# Patient Record
Sex: FEMALE | Race: WHITE | NOT HISPANIC OR LATINO | Employment: FULL TIME | URBAN - METROPOLITAN AREA
[De-identification: names, ages, dates, MRNs, and addresses within clinical notes are randomized per-mention and may not be internally consistent; named-entity substitution may affect disease eponyms.]

---

## 2017-06-13 ENCOUNTER — OFFICE VISIT (OUTPATIENT)
Dept: FAMILY MEDICINE | Facility: CLINIC | Age: 67
End: 2017-06-13
Payer: COMMERCIAL

## 2017-06-13 VITALS
HEIGHT: 64 IN | SYSTOLIC BLOOD PRESSURE: 137 MMHG | HEART RATE: 74 BPM | DIASTOLIC BLOOD PRESSURE: 89 MMHG | BODY MASS INDEX: 33.53 KG/M2 | WEIGHT: 196.4 LBS

## 2017-06-13 DIAGNOSIS — N64.4 BREAST PAIN: Primary | ICD-10-CM

## 2017-06-13 PROCEDURE — 99213 OFFICE O/P EST LOW 20 MIN: CPT | Performed by: FAMILY MEDICINE

## 2017-06-13 NOTE — PATIENT INSTRUCTIONS
Thank you for choosing [facility].  You may be receiving a survey in the mail from [facility] regarding your visit today.  Please take a few minutes to complete and return the survey to let us know how we are doing.  Our Clinic hours are:  Mondays    7:20 am - 7 pm  Tues -  Fri  7:20 am - 5 pm  Clinic Phone: [phone]  The clinic lab opens at 7:30 am Mon - Fri and appointments are required.  [facility]. [phone]  Monday-Thursday 8 am - 7pm  Tues/Wed/Fri 8 am - 5:30 pm

## 2017-06-13 NOTE — PROGRESS NOTES
"  SUBJECTIVE:                                                    Kay Mendes is a 66 year old female who presents to clinic today for the following health issues:    Chief Complaint   Patient presents with     Breast Problem     Patient states she has breast tenderness in right breast has a sharp pain that is localized for the last 2 days, she also states she may feel a lump. Patient states she has armpit fullness that it has been going on for X2 months.        She has a maternal history of breast cancer at age 74.  Her last mammogram was normal two years ago.  She does not recall any recent breast trauma, but can localize a small fingertip-size focus on her mid-upper breast where she notes tenderness.    OBJECTIVE: /89 (BP Location: Left arm, Patient Position: Chair, Cuff Size: Adult Large)  Pulse 74  Ht 5' 3.75\" (1.619 m)  Wt 196 lb 6.4 oz (89.1 kg)  BMI 33.98 kg/m2    Breasts are grossly symmetric. No masses are detected, no nipple discharge, no axillary adenopathy.  ON the superior mid-portion of the breast there is an easily visible subcutaneous horizontal vein that appears a little darker in the mid-portion, precisely at the site where the patient notes her pain, suggesting her tenderness may be secondary to a local vascular irritation or trauma rather than originating from the breast tissue itself.    ASSESSMENT: right upper breast pain    PLAN: Bilateral diagnostic mammogram and right breast ultrasound.    Zehra Chinchilla md  "

## 2017-06-13 NOTE — NURSING NOTE
"Chief Complaint   Patient presents with     Breast Problem     Patient states she has breast tenderness in right breast has a sharp pain that is localized for the last 2 days, she also states she may feel a lump. Patient states she has armpit fullness that it has been going on for X2 months.        Initial /89 (BP Location: Left arm, Patient Position: Chair, Cuff Size: Adult Large)  Pulse 74  Ht 5' 3.75\" (1.619 m)  Wt 196 lb 6.4 oz (89.1 kg)  BMI 33.98 kg/m2 Estimated body mass index is 33.98 kg/(m^2) as calculated from the following:    Height as of this encounter: 5' 3.75\" (1.619 m).    Weight as of this encounter: 196 lb 6.4 oz (89.1 kg).  Medication Reconciliation: complete    "

## 2017-06-13 NOTE — MR AVS SNAPSHOT
After Visit Summary   6/13/2017    Kay Mendes    MRN: [id number]           Patient Information     Date Of Birth          1950        Visit Information        Provider Department      6/13/2017 2:00 PM Zehra Chinchilla MD [facility]        Today's Diagnoses     Breast pain    -  1      Care Instructions    Thank you for choosing [facility].  You may be receiving a survey in the mail from [facility] regarding your visit today.  Please take a few minutes to complete and return the survey to let us know how we are doing.  Our Clinic hours are:  Mondays    7:20 am - 7 pm  Tues -  Fri  7:20 am - 5 pm  Clinic Phone: [phone]  The clinic lab opens at 7:30 am Mon - Fri and appointments are required.  [facility]  Ph. [phone]  Monday-Thursday 8 am - 7pm  Tues/Wed/Fri 8 am - 5:30 pm            Follow-ups after your visit        Future tests that were ordered for you today     Open Future Orders        Priority Expected Expires Ordered    MA Diagnostic Digital Bilateral Routine  6/13/2018 6/13/2017    US Breast Right Limited 1-3 Quadrants Routine  6/13/2018 6/13/2017            Who to contact     If you have questions or need follow up information about today's clinic visit or your schedule please contact [facility] directly at [phone].  Normal or non-critical lab and imaging results will be communicated to you by MyChart, letter or phone within 4 business days after the clinic has received the results. If you do not hear from us within 7 days, please contact the clinic through MyChart or phone. If you have a critical or abnormal lab result, we will notify you by phone as soon as possible.  Submit refill requests through sfilatino or call your pharmacy and they will forward the refill request to us. Please allow 3 business days for your refill to be completed.          Additional Information About Your Visit        MyChart  "Information     Liv gives you secure access to your electronic health record. If you see a primary care provider, you can also send messages to your care team and make appointments. If you have questions, please call your primary care clinic.  If you do not have a primary care provider, please call [phone] and they will assist you.        Care EveryWhere ID     This is your Care EveryWhere ID. This could be used by other organizations to access your [facility] medical records  [id number]        Your Vitals Were     Pulse Height BMI (Body Mass Index)             74 5' 3.75\" (1.619 m) 33.98 kg/m2          Blood Pressure from Last 3 Encounters:   06/13/17 137/89   01/31/16 134/74   12/11/15 137/89    Weight from Last 3 Encounters:   06/13/17 196 lb 6.4 oz (89.1 kg)   12/11/15 202 lb 4.8 oz (91.8 kg)   08/24/15 201 lb 3.2 oz (91.3 kg)                 Today's Medication Changes          These changes are accurate as of: 6/13/17  2:49 PM.  If you have any questions, ask your nurse or doctor.               Stop taking these medicines if you haven't already. Please contact your care team if you have questions.     azithromycin 250 MG tablet   Commonly known as:  ZITHROMAX   Stopped by:  Zehra Chinchilla MD           benzonatate 200 MG capsule   Commonly known as:  TESSALON   Stopped by:  Zerha Chinchilla MD           HYDROcodone-acetaminophen 5-325 MG per tablet   Commonly known as:  NORCO   Stopped by:  Zehra Chinchilla MD           order for DME   Stopped by:  Zehra Chinchilla MD                    Primary Care Provider Office Phone # Fax #    Princess Tariq [phone]       [facility] [id number] [place] [id number]        Thank you!     Thank you for choosing [facility]  for your care. Our goal is always to provide you with excellent care. Hearing back from our patients is one way we can continue to improve our services. Please take a few minutes " to complete the written survey that you may receive in the mail after your visit with us. Thank you!             Your Updated Medication List - Protect others around you: Learn how to safely use, store and throw away your medicines at www.disposemymeds.org.          This list is accurate as of: 6/13/17  2:49 PM.  Always use your most recent med list.                   Brand Name Dispense Instructions for use    omeprazole 20 MG tablet     90 tablet    Take 1 tablet (20 mg) by mouth daily Take 30-60 minutes before a meal.

## 2017-06-30 ENCOUNTER — HOSPITAL ENCOUNTER (OUTPATIENT)
Dept: MAMMOGRAPHY | Facility: CLINIC | Age: 67
Discharge: HOME | End: 2017-06-30
Attending: FAMILY MEDICINE | Admitting: FAMILY MEDICINE
Payer: COMMERCIAL

## 2017-06-30 ENCOUNTER — HOSPITAL ENCOUNTER (OUTPATIENT)
Dept: ULTRASOUND IMAGING | Facility: CLINIC | Age: 67
End: 2017-06-30
Attending: FAMILY MEDICINE
Payer: COMMERCIAL

## 2017-06-30 DIAGNOSIS — N64.4 BREAST PAIN: ICD-10-CM

## 2017-06-30 PROCEDURE — 76642 ULTRASOUND BREAST LIMITED: CPT | Mod: RT

## 2017-06-30 PROCEDURE — G0204 DX MAMMO INCL CAD BI: HCPCS

## 2017-09-15 ENCOUNTER — OFFICE VISIT (OUTPATIENT)
Dept: FAMILY MEDICINE | Facility: CLINIC | Age: 67
End: 2017-09-15
Payer: COMMERCIAL

## 2017-09-15 VITALS
BODY MASS INDEX: 33.97 KG/M2 | SYSTOLIC BLOOD PRESSURE: 134 MMHG | DIASTOLIC BLOOD PRESSURE: 78 MMHG | TEMPERATURE: 97 F | RESPIRATION RATE: 16 BRPM | HEART RATE: 71 BPM | HEIGHT: 64 IN | WEIGHT: 199 LBS

## 2017-09-15 DIAGNOSIS — M51.369 DDD (DEGENERATIVE DISC DISEASE), LUMBAR: ICD-10-CM

## 2017-09-15 DIAGNOSIS — M54.50 ACUTE MIDLINE LOW BACK PAIN WITHOUT SCIATICA: Primary | ICD-10-CM

## 2017-09-15 PROCEDURE — 99213 OFFICE O/P EST LOW 20 MIN: CPT | Performed by: NURSE PRACTITIONER

## 2017-09-15 ASSESSMENT — PAIN SCALES - GENERAL: PAINLEVEL: SEVERE PAIN (7)

## 2017-09-15 NOTE — NURSING NOTE
"Chief Complaint   Patient presents with     Back Pain       Initial /78 (BP Location: Right arm, Patient Position: Chair, Cuff Size: Adult Large)  Pulse 71  Temp 97  F (36.1  C) (Oral)  Resp 16  Ht 5' 3.75\" (1.619 m)  Wt 199 lb (90.3 kg)  BMI 34.43 kg/m2 Estimated body mass index is 34.43 kg/(m^2) as calculated from the following:    Height as of this encounter: 5' 3.75\" (1.619 m).    Weight as of this encounter: 199 lb (90.3 kg).  Medication Reconciliation: complete  "

## 2017-09-15 NOTE — MR AVS SNAPSHOT
After Visit Summary   9/15/2017    Kay Mendes    MRN: [id number]           Patient Information     Date Of Birth          1950        Visit Information        Provider Department      9/15/2017 1:40 PM Princess Vera APRN CNP [facility]        Today's Diagnoses     Acute midline low back pain without sciatica    -  1    DDD (degenerative disc disease), lumbar          Care Instructions    Continue with gentle stretching, ice, walking and ibuprofen as tolerated.  Add the muscle relaxant and start PT.  If no improvement noted, will likely need to repeat MRI.  Follow up if symptoms persist or worsen and as needed.        Thank you for choosing [facility].  You may be receiving a survey in the mail from [employer] regarding your visit today.  Please take a few minutes to complete and return the survey to let us know how we are doing.      Our Clinic hours are:  Mondays    7:20 am - 7 pm  Tues -  Fri  7:20 am - 5 pm    Clinic Phone: [phone]    The clinic lab opens at 7:30 am Mon - Fri and appointments are required.    [facility]  Ph. [phone]  Monday-Thursday 8 am - 7pm  Tues/Wed/Fri 8 am - 5:30 pm                 Follow-ups after your visit        Additional Services     PHYSICAL THERAPY REFERRAL       *This therapy referral will be filtered to a centralized scheduling office at [facility] and the patient will receive a call to schedule an appointment at a [facility] location most convenient for them. *     [facility] provides Physical Therapy evaluation and treatment and many specialty services across the [facility] system.  If requesting a specialty program, please choose from the list below.    If you have not heard from the scheduling office within 2 business days, please call [phone] for all locations, with the exception of [place], please call [phone].  Treatment: Evaluation &  "Treatment  Special Instructions/Modalities:   Special Programs:     Please be aware that coverage of these services is subject to the terms and limitations of your health insurance plan.  Call member services at your health plan with any benefit or coverage questions.      **Note to Provider:  If you are referring outside of [facility] for the therapy appointment, please list the name of the location in the \"special instructions\" above, print the referral and give to the patient to schedule the appointment.                  Who to contact     If you have questions or need follow up information about today's clinic visit or your schedule please contact [facility] directly at [phone].  Normal or non-critical lab and imaging results will be communicated to you by Buzz Referralshart, letter or phone within 4 business days after the clinic has received the results. If you do not hear from us within 7 days, please contact the clinic through Revt or phone. If you have a critical or abnormal lab result, we will notify you by phone as soon as possible.  Submit refill requests through Bunndle or call your pharmacy and they will forward the refill request to us. Please allow 3 business days for your refill to be completed.          Additional Information About Your Visit        Bunndle Information     B[employer]le gives you secure access to your electronic health record. If you see a primary care provider, you can also send messages to your care team and make appointments. If you have questions, please call your primary care clinic.  If you do not have a primary care provider, please call [phone] and they will assist you.        Care EveryWhere ID     This is your Care EveryWhere ID. This could be used by other organizations to access your [facility] medical records  [id number]        Your Vitals Were     Pulse Temperature Respirations Height BMI (Body Mass Index)       71 97  F (36.1  C) (Oral) 16 5' 3.75\" " (1.619 m) 34.43 kg/m2        Blood Pressure from Last 3 Encounters:   09/15/17 134/78   06/13/17 137/89   01/31/16 134/74    Weight from Last 3 Encounters:   09/15/17 199 lb (90.3 kg)   06/13/17 196 lb 6.4 oz (89.1 kg)   12/11/15 202 lb 4.8 oz (91.8 kg)              We Performed the Following     PHYSICAL THERAPY REFERRAL          Today's Medication Changes          These changes are accurate as of: 9/15/17  2:13 PM.  If you have any questions, ask your nurse or doctor.               Start taking these medicines.        Dose/Directions    tiZANidine 4 MG tablet   Commonly known as:  ZANAFLEX   Used for:  Acute midline low back pain without sciatica   Started by:  Princess Vera APRN CNP        Dose:  4-8 mg   Take 1-2 tablets (4-8 mg) by mouth 3 times daily as needed for muscle spasms   Quantity:  30 tablet   Refills:  0            Where to get your medicines      These medications were sent to [place], [place] - [address]  [address] [facility] [address]     Phone:  [phone]     tiZANidine 4 MG tablet                Primary Care Provider Office Phone # Fax #    Princess Tariq [phone] [address] [facility] [address]        Equal Access to Services     [facility] AH: Hadii keiko matthews hadasho Sosteveali, waaxda luqadaha, qaybta kaalmada adeegyada, aditya guerra. So [facility] [phone].    ATENCIÓN: Si habla español, tiene a yeager disposición servicios gratuitos de asistencia lingüística. Llame al [phone].    We comply with applicable federal civil rights laws and [place] laws. We do not discriminate on the basis of race, color, national origin, age, disability sex, sexual orientation or gender identity.            Thank you!     Thank you for choosing [facility]  for your care. Our goal is always to provide you with excellent care. Hearing back from our patients is  one way we can continue to improve our services. Please take a few minutes to complete the written survey that you may receive in the mail after your visit with us. Thank you!             Your Updated Medication List - Protect others around you: Learn how to safely use, store and throw away your medicines at www.disposemymeds.org.          This list is accurate as of: 9/15/17  2:13 PM.  Always use your most recent med list.                   Brand Name Dispense Instructions for use Diagnosis    omeprazole 20 MG tablet     90 tablet    Take 1 tablet (20 mg) by mouth daily Take 30-60 minutes before a meal.    Esophageal reflux       tiZANidine 4 MG tablet    ZANAFLEX    30 tablet    Take 1-2 tablets (4-8 mg) by mouth 3 times daily as needed for muscle spasms    Acute midline low back pain without sciatica

## 2017-09-15 NOTE — PROGRESS NOTES
SUBJECTIVE:   Kay Mendes is a 66 year old female who presents to clinic today for the following health issues:      Back Pain       Duration: started yesterday        Specific cause: lifting, turning/bending    Description:   Location of pain: low back right  Character of pain: sharp, stabbing and intermittent  Pain radiation:none  New numbness or weakness in legs, not attributed to pain:  no     Intensity: Currently 7/10, At its worst 8/10    History:   Pain interferes with job: YES  History of back problems: recurrent self limited episodes of low back pain in the past  Any previous MRI or X-rays: Yes- at [facility].  Date at least 10 years ago.  Sees a specialist for back pain:  No  Therapies tried without relief: acetaminophen (Tylenol), cold and NSAIDs    Alleviating factors:   Improved by: acetaminophen (Tylenol), cold and NSAIDs      Precipitating factors:  Worsened by: Lifting, Bending, Lying Flat, Walking and Coughing    Functional and Psychosocial Screen (Bon STarT Back):      Not performed today          Accompanying Signs & Symptoms:  Risk of Fracture:  None  Risk of Cauda Equina:  None  Risk of Infection:  None  Risk of Cancer:  None  Risk of Ankylosing Spondylitis:  Onset at age <35, male, AND morning back stiffness. no                       Problem list and histories reviewed & adjusted, as indicated.  Additional history: she has known DDD but hasn't been bothered with it very much. Had an MRI in 2005 see below:    CONCLUSION:         1)         Multilevel degenerative disc disease.   2)         L4-5:  Moderate-sized right asymmetric disc protrusion   which abuts the right L5 nerve root.  Mild central stenosis.      No problems after injection until 2015 and PT helped to resolve back pain then. She denies radiation at this time. She knew as soon as she twisted with her grand daughter she'd have back pain.      Patient Active Problem List   Diagnosis     External hemorrhoids     Advanced  directives, counseling/discussion     CARDIOVASCULAR SCREENING; LDL GOAL LESS THAN 160     Diverticulosis of sigmoid colon     Adhesive capsulitis of left shoulder     Esophageal reflux     Past Surgical History:   Procedure Laterality Date     COLONOSCOPY  11/30/2012    Procedure: COLONOSCOPY;  Colonoscopy  ;  Surgeon: Fidel Maradiaga MD;  Location: [facility]     HC REMOVAL OF TONSILS,<15 Y/O  1956    Tonsils <14y.o.     SURGICAL HISTORY OF -   1980    wisdom teeth     SURGICAL HISTORY OF -   1993    needle bx lt breast benign       Social History   Substance Use Topics     Smoking status: Never Smoker     Smokeless tobacco: Never Used     Alcohol use Yes      Comment: rare     Family History   Problem Relation Age of Onset     CANCER Father      lung     CANCER Mother      breast, 72 years old     Neurologic Disorder Sister      ataxia-- genetic link 3 siblings( 1 brother, 2 sis) - affects speech and balance      Neurologic Disorder Brother      Neurologic Disorder Sister          Current Outpatient Prescriptions   Medication Sig Dispense Refill     tiZANidine (ZANAFLEX) 4 MG tablet Take 1-2 tablets (4-8 mg) by mouth 3 times daily as needed for muscle spasms 30 tablet 0     omeprazole 20 MG tablet Take 1 tablet (20 mg) by mouth daily Take 30-60 minutes before a meal. 90 tablet 3     No Known Allergies  BP Readings from Last 3 Encounters:   09/15/17 134/78   06/13/17 137/89   01/31/16 134/74    Wt Readings from Last 3 Encounters:   09/15/17 199 lb (90.3 kg)   06/13/17 196 lb 6.4 oz (89.1 kg)   12/11/15 202 lb 4.8 oz (91.8 kg)                        Reviewed and updated as needed this visit by clinical staffTobacco  Allergies  Med Hx  Surg Hx  Fam Hx  Soc Hx      Reviewed and updated as needed this visit by Provider         10 point ROS of systems including Constitutional, Eyes, Respiratory, Cardiovascular, Gastroenterology, Genitourinary, Integumentary, Muscularskeletal, Psychiatric were all negative  "except for pertinent positives noted in my HPI.    OBJECTIVE:     /78 (BP Location: Right arm, Patient Position: Chair, Cuff Size: Adult Large)  Pulse 71  Temp 97  F (36.1  C) (Oral)  Resp 16  Ht 5' 3.75\" (1.619 m)  Wt 199 lb (90.3 kg)  BMI 34.43 kg/m2  Body mass index is 34.43 kg/(m^2).  GENERAL: healthy, alert and no distress  NECK: no adenopathy, no asymmetry  RESP: lungs clear to auscultation - no rales, rhonchi or wheezes  CV: regular rate and rhythm, normal S1 S2, no S3 or S4, no murmur  ABDOMEN: soft, nontender  MS: no gross musculoskeletal defects noted, she has some pain with heel walking, none with toe walking, able to forward flex to 45 decreased back flexion, mild pain with right SLR, 2+ equal patella DTR's, no pain over spine, some over sacrum, none over SI joints      Diagnostic Test Results:  none     ASSESSMENT/PLAN:             1. Acute midline low back pain without sciatica    - tiZANidine (ZANAFLEX) 4 MG tablet; Take 1-2 tablets (4-8 mg) by mouth 3 times daily as needed for muscle spasms  Dispense: 30 tablet; Refill: 0  - PHYSICAL THERAPY REFERRAL  Discussed how to take the medication(s), expected outcomes, potential side effects.      2. DDD (degenerative disc disease), lumbar    - PHYSICAL THERAPY REFERRAL    See Patient Instructions  Patient Instructions   Continue with gentle stretching, ice, walking and ibuprofen as tolerated.  Add the muscle relaxant and start PT.  If no improvement noted, will likely need to repeat MRI.  Follow up if symptoms persist or worsen and as needed.        Thank you for choosing [facility].  You may be receiving a survey in the mail from [employer] regarding your visit today.  Please take a few minutes to complete and return the survey to let us know how we are doing.      Our Clinic hours are:  Mondays    7:20 am - 7 pm  Tues -  Fri  7:20 am - 5 pm    Clinic Phone: [phone]    The clinic lab opens at 7:30 am Mon - Fri and appointments are " required.    [facility]  Ph. [phone]  Monday-Thursday 8 am - 7pm  Tues/Wed/Fri 8 am - 5:30 pm             [facility]

## 2017-09-15 NOTE — PATIENT INSTRUCTIONS
Continue with gentle stretching, ice, walking and ibuprofen as tolerated.  Add the muscle relaxant and start PT.  If no improvement noted, will likely need to repeat MRI.  Follow up if symptoms persist or worsen and as needed.        Thank you for choosing [facility].  You may be receiving a survey in the mail from [employer] regarding your visit today.  Please take a few minutes to complete and return the survey to let us know how we are doing.      Our Clinic hours are:  Mondays    7:20 am - 7 pm  Tues -  Fri  7:20 am - 5 pm    Clinic Phone: [phone]    The clinic lab opens at 7:30 am Mon - Fri and appointments are required.    [facility]. [phone]  Monday-Thursday 8 am - 7pm  Tues/Wed/Fri 8 am - 5:30 pm

## 2017-09-25 ENCOUNTER — HOSPITAL ENCOUNTER (OUTPATIENT)
Dept: PHYSICAL THERAPY | Facility: CLINIC | Age: 67
Setting detail: THERAPIES SERIES
End: 2017-09-25
Attending: NURSE PRACTITIONER
Payer: COMMERCIAL

## 2017-09-25 PROCEDURE — 97161 PT EVAL LOW COMPLEX 20 MIN: CPT | Mod: GP | Performed by: PHYSICAL THERAPIST

## 2017-09-25 PROCEDURE — 97140 MANUAL THERAPY 1/> REGIONS: CPT | Mod: GP | Performed by: PHYSICAL THERAPIST

## 2017-09-25 PROCEDURE — 40000718 ZZHC STATISTIC PT DEPARTMENT ORTHO VISIT: Performed by: PHYSICAL THERAPIST

## 2017-09-25 PROCEDURE — G8978 MOBILITY CURRENT STATUS: HCPCS | Mod: GP,CJ | Performed by: PHYSICAL THERAPIST

## 2017-09-25 PROCEDURE — G8979 MOBILITY GOAL STATUS: HCPCS | Mod: GP,CI | Performed by: PHYSICAL THERAPIST

## 2017-09-25 PROCEDURE — 97110 THERAPEUTIC EXERCISES: CPT | Mod: GP | Performed by: PHYSICAL THERAPIST

## 2017-09-25 NOTE — PROGRESS NOTES
Kay Mendes  1950 Physical Therapy Initial Evaluation  09/25/17 1100   General Information   Type of Visit Initial OP Ortho PT Evaluation   Start of Care Date 09/25/17   Referring Physician Princess Vera   Patient/Family Goals Statement Care for grandchildren without back pain   Orders Evaluate and Treat   Date of Order 09/15/17   Insurance Type [employer]   Insurance Comments/Visits Authorized 20   Medical Diagnosis Acute midline low back pain without sciatica / DDD (degenerative disc disease), lumbar    Surgical/Medical history reviewed Yes   Precautions/Limitations no known precautions/limitations   Body Part(s)   Body Part(s) Lumbar Spine/SI   Presentation and Etiology   Pertinent history of current problem (include personal factors and/or comorbidities that impact the POC) Was carrying a grandchild and twisted and set the baby down and felt pain in her back. 1/10 pain currently, dull ache. Bending will increase pain. Has been doing stretches to help with pain. Has helped somewhat. No symptoms into legs. Has had a bulging disk in 2005. Has had some sciatica about 2 years ago. / Comorbidities - No comorbidities listed in EMR impacting PT    Impairments A. Pain;E. Decreased flexibility;F. Decreased strength and endurance   Functional Limitations perform activities of daily living;perform desired leisure / sports activities   Symptom Location Right lumbar spine   How/Where did it occur At home;While lifting   Onset date of current episode/exacerbation 09/14/17   Chronicity Recurrent   Pain rating (0-10 point scale) Best (/10);Worst (/10)   Best (/10) 1   Worst (/10) 3   Pain quality B. Dull;C. Aching   Frequency of pain/symptoms C. With activity   Pain/symptoms exacerbated by C. Lifting;D. Carrying;I. Bending   Pain/symptoms eased by I. OTC medication(s);G. Heat;H. Cold   Prior Level of Function   Functional Level Prior Comment Ind   Current Level of Function   Patient role/employment history F. Retired    Living environment [facility]   Home/community accessibility 2 flights without rails   Current equipment-Gait/Locomotion None   Fall Risk Screen   Fall screen completed by PT   Per patient - Fall 2 or more times in past year? No   Per patient - Fall with injury in past year? No   Is patient a fall risk? No   Lumbar Spine/SI Objective Findings   Posture Slightly slouched seated posture   Gait/Locomotion Non-antalgic with good heel strike and toe off. Minimal lumbar rotation   Flexion ROM 4 inches from floor   Extension ROM 13 degrees   Right Side Bending ROM 3 inches past knee joint with pain on right   Left Side Bending ROM 3 inches past knee joint with no pain   Repeated Extension-Standing ROM Pain on right lumbar spine   Repeated Flexion-Standing ROM Pain on right lumbar spine   Pelvic Screen Negative for SI provocation tests   Hip Flexion (L2) Strength 4/5 B   Hip Abduction Strength 4-/5 B   Hip Extension Strength 4-/5 B   Knee Extension (L3) Strength 5/5 B   Ankle Dorsiflexion (L4) Strength 5/5 B   Great Toe Extension (L5) Strength 5/5 B   Ankle Plantar Flexion (S1) Strength 5/5 B   Hamstring Flexibility Mildly restricted B   Piriformis Flexibility Unable to test on right due to pain in low back    SLR Negative B   Slump Test Negative B   Segmental Mobility Restricted L3-L5 with pain   Sensation Testing No deficits noted   Patellar Tendon Reflexes  2+ B   Palpation Hypertonicity over right lumbar paraspinals L3-L5 / ASIS equal heights B and medial malleoli equal heights B   Planned Therapy Interventions   Planned Therapy Interventions joint mobilization;manual therapy;neuromuscular re-education;ROM;strengthening;stretching   Planned Modality Interventions   Planned Modality Interventions Cryotherapy;Hot packs;TENS;Traction   Clinical Impression   Criteria for Skilled Therapeutic Interventions Met yes, treatment indicated   PT Diagnosis Lumbar spine pain with ROM restrictions and deficits in core strength.    Influenced by the following impairments Pain, flexibility deficits, strength deficits   Functional limitations due to impairments Difficulty lifting, standing   Clinical Presentation Stable/Uncomplicated   Clinical Presentation Rationale No comorbidities impacting PT / 1 body system / Stable   Clinical Decision Making (Complexity) Low complexity   Therapy Frequency 1 time/week   Predicted Duration of Therapy Intervention (days/wks) 8 weeks   Risk & Benefits of therapy have been explained Yes   Patient, Family & other staff in agreement with plan of care Yes   Education Assessment   Preferred Learning Style Reading;Listening;Demonstration;Pictures/video   Barriers to Learning No barriers   ORTHO GOALS   PT Ortho Eval Goals 1;2;3;4   Ortho Goal 1   Goal Identifier HEP   Goal Description Pt will be independent in HEP in order to achieve and maintain long term treatment goals.   Target Date 10/09/17   Ortho Goal 2   Goal Identifier Lifting   Goal Description Pt will be able to lift her grandchildren from the floor with proper lifting technique with a minimal increase in discomfort 1/10.   Target Date 11/20/17   Ortho Goal 3   Goal Identifier Standing   Goal Description Pt will be able to stand for 1 hour with a minimal increase in low back pain 1-2/10.   Target Date 11/20/17   Total Evaluation Time   Total Evaluation Time 20     Rboerto Blakely, PT, DPT

## 2017-10-02 ENCOUNTER — HOSPITAL ENCOUNTER (OUTPATIENT)
Dept: PHYSICAL THERAPY | Facility: CLINIC | Age: 67
Setting detail: THERAPIES SERIES
End: 2017-10-02
Attending: ORTHOPAEDIC SURGERY
Payer: MEDICARE

## 2017-10-02 PROCEDURE — 97140 MANUAL THERAPY 1/> REGIONS: CPT | Mod: GP | Performed by: PHYSICAL THERAPIST

## 2017-10-02 PROCEDURE — 97110 THERAPEUTIC EXERCISES: CPT | Mod: GP | Performed by: PHYSICAL THERAPIST

## 2017-10-02 PROCEDURE — 40000718 ZZHC STATISTIC PT DEPARTMENT ORTHO VISIT: Performed by: PHYSICAL THERAPIST

## 2017-10-02 NOTE — PROGRESS NOTES
[facility]          OUTPATIENT PHYSICAL THERAPY ORTHOPEDIC EVALUATION  PLAN OF TREATMENT FOR OUTPATIENT REHABILITATION  (COMPLETE FOR INITIAL CLAIMS ONLY)  Patient's Last Name, First Name, M.I.  YOB: 1950  Kay Mendes s Name:  [facility]   Medical Record No.  [id number]   Start of Care Date:  09/25/17   Onset Date:  09/14/17   Type:     _X__PT   ___OT   ___SLP Medical Diagnosis:  Acute midline low back pain without sciatica / DDD (degenerative disc disease), lumbar      PT Diagnosis:  Lumbar spine pain with ROM restrictions and deficits in core strength.   Visits from SOC:  1      _________________________________________________________________________________  Plan of Treatment/Functional Goals:  joint mobilization, manual therapy, neuromuscular re-education, ROM, strengthening, stretching     Cryotherapy, Hot packs, TENS, Traction     Goals  Goal Identifier: HEP  Goal Description: Pt will be independent in [facility] in order to achieve and maintain long term treatment goals.  Target Date: 10/09/17    Goal Identifier: Lifting  Goal Description: Pt will be able to lift her grandchildren from the floor with proper lifting technique with a minimal increase in discomfort 1/10.  Target Date: 11/20/17    Goal Identifier: Standing  Goal Description: Pt will be able to stand for 1 hour with a minimal increase in low back pain 1-2/10.  Target Date: 11/20/17                                                           Therapy Frequency:  1 time/week  Predicted Duration of Therapy Intervention:  8 weeks    Bear Blakely, PT                 I CERTIFY THE NEED FOR THESE SERVICES FURNISHED UNDER        THIS PLAN OF TREATMENT AND WHILE UNDER MY CARE     (Physician co-signature of this document indicates review and certification of the therapy plan).                         Certification Date From:  9/25/2017  Certification Date To:  11/20/17    Referring  Provider:  Princess Vera    Initial Assessment        See Epic Evaluation Start of Care Date: 09/25/17

## 2017-10-02 NOTE — ADDENDUM NOTE
Encounter addended by: Bear Blakely, PT on: 10/2/2017  3:35 PM<BR>     Actions taken: Sign clinical note, Charge Capture section accepted, Flowsheet accepted, Document created

## 2017-10-09 ENCOUNTER — HOSPITAL ENCOUNTER (OUTPATIENT)
Dept: PHYSICAL THERAPY | Facility: CLINIC | Age: 67
Setting detail: THERAPIES SERIES
End: 2017-10-09
Attending: ORTHOPAEDIC SURGERY
Payer: MEDICARE

## 2017-10-09 PROCEDURE — 97140 MANUAL THERAPY 1/> REGIONS: CPT | Mod: GP | Performed by: PHYSICAL THERAPIST

## 2017-10-09 PROCEDURE — 40000718 ZZHC STATISTIC PT DEPARTMENT ORTHO VISIT: Performed by: PHYSICAL THERAPIST

## 2017-10-16 ENCOUNTER — HOSPITAL ENCOUNTER (OUTPATIENT)
Dept: PHYSICAL THERAPY | Facility: CLINIC | Age: 67
Setting detail: THERAPIES SERIES
End: 2017-10-16
Attending: NURSE PRACTITIONER
Payer: MEDICARE

## 2017-10-16 PROCEDURE — 97140 MANUAL THERAPY 1/> REGIONS: CPT | Mod: GP | Performed by: PHYSICAL THERAPIST

## 2017-10-16 PROCEDURE — 40000718 ZZHC STATISTIC PT DEPARTMENT ORTHO VISIT: Performed by: PHYSICAL THERAPIST

## 2017-10-23 ENCOUNTER — HOSPITAL ENCOUNTER (OUTPATIENT)
Dept: PHYSICAL THERAPY | Facility: CLINIC | Age: 67
Setting detail: THERAPIES SERIES
End: 2017-10-23
Attending: NURSE PRACTITIONER
Payer: MEDICARE

## 2017-10-23 PROCEDURE — 97140 MANUAL THERAPY 1/> REGIONS: CPT | Mod: GP | Performed by: PHYSICAL THERAPIST

## 2017-10-23 PROCEDURE — 40000718 ZZHC STATISTIC PT DEPARTMENT ORTHO VISIT: Performed by: PHYSICAL THERAPIST

## 2017-10-23 PROCEDURE — 97110 THERAPEUTIC EXERCISES: CPT | Mod: GP | Performed by: PHYSICAL THERAPIST

## 2017-10-23 PROCEDURE — G8979 MOBILITY GOAL STATUS: HCPCS | Mod: GP,CI | Performed by: PHYSICAL THERAPIST

## 2017-10-23 PROCEDURE — G8978 MOBILITY CURRENT STATUS: HCPCS | Mod: GP,CI | Performed by: PHYSICAL THERAPIST

## 2017-10-23 NOTE — PROGRESS NOTES
Kay Mendes  1950  Diagnosis - Acute midline low back pain without sciatica / DDD (degenerative disc disease), lumbar Physical Therapy Progress Note  10/23/17 1000   Signing Clinician's Name / Credentials   Signing clinician's name / credentials Roberto Blakely, PT, DPT   Session Number   Session Number 5 (Start of Care Date - 9/25/2017)   Progress Note/Recertification   Progress Note Due Date 10/25/17   Progress Note Completed Date 10/23/17   Recertification Due Date 11/20/17   PT Medicare Only G-code   G-code Mobility: Walking & Moving Around   Mobility: Walking & Moving Around   Mobility Current Status,  (eval/re-eval & every progress note) CI: 1-19% impairment   Current Mobility Modifier Rationale Based on clinical judgment and the patient's ability to perform transfers, ambulate, and perform exercises within PT session. Also based on the patient's subjective report of ability to perform functional activities.   Mobility Goal,  (eval/re-eval, every progress note, & discharge) CI: 1-19% impairment   Adult Goals   PT Ortho Eval Goals 1;2;3;4   Ortho Goal 1   Goal Identifier HEP   Goal Description Pt will be independent in HEP in order to achieve and maintain long term treatment goals.   Target Date 10/09/17   Date Met 10/23/17   Ortho Goal 2   Goal Identifier Lifting   Goal Description Pt will be able to lift her grandchildren from the floor with proper lifting technique with a minimal increase in discomfort 1/10.   Target Date 11/20/17   Date Met 10/23/17   Ortho Goal 3   Goal Identifier Standing   Goal Description Pt will be able to stand for 1 hour with a minimal increase in low back pain 1-2/10.   Target Date 11/20/17   Date Met 10/23/17   Subjective Report   Subjective Report Back has been good over the last week. Drove over the weekend and that went well. 0/10 pain currently.    Objective Measures   Objective Measures Objective Measure 1;Objective Measure 2   Objective Measure 1   Objective  "Measure Bon   Details Low   Objective Measure 2   Objective Measure ARRON   Details 0%   Objective Measure 3   Objective Measure Palpation   Details No discomfort with palpation of L3-L5 vertebrae   Treatment Interventions   Interventions Therapeutic Procedure/Exercise;Manual Therapy   Therapeutic Procedure/exercise   Minutes 14   Skilled Intervention Strengthening exercise instruction    Patient Response Performed exercises well with good technique   Treatment Detail Supine knee push x10 B with pelvic tilts with 5 second holds / Bridges x15 with 5 second holds / Supine knee extension with pelvic tilts x10 B /      Manual Therapy   Minutes 15   Skilled Intervention STM / Joint mobilizations / Manual lumbar traction   Patient Response \"That felt pretty good.\"   Treatment Detail PA joint mobilizations grades 1-3 T10-L5 to improve motion / STM to lumbar paraspinals B T10-L5 to reduce tone / Manual lumbar traction to reduce pain and improve flexibility     Education   Learner Patient   Readiness Acceptance   Method Booklet/handout;Explanation;Demonstration   Response Verbalizes Understanding;Demonstrates Understanding   Education Comments VHI handout   Plan   Home program Supine knee extension with pelvic tilt / Bridges / Angry Cat x15 / Lumbar rotations x10 with 10 second holds / Piriformis stretfch seated / Pelvic tilts in standing / Kneeling hip flexor stretch   Updates to plan of care PT on hold   Plan for next session STM if helpful / Traction if needed   Comments   Comments Pt has done well throughout physical therapy and is not currently having any pain. PT will be put on hold in case of flare-up over next few weeks.   Total Session Time   Timed Code Treatment Minutes 29   Total Treatment Time (sum of timed and untimed services) 29 (1TE 1MT)     Referring Physician - Princess Vera  "

## 2018-03-05 ENCOUNTER — DOCUMENTATION ONLY (OUTPATIENT)
Dept: PHYSICAL THERAPY | Facility: CLINIC | Age: 68
End: 2018-03-05

## 2018-03-05 NOTE — PROGRESS NOTES
Kay Mendes  1950  Diagnosis - Acute midline low back pain without sciatica / DDD (degenerative disc disease), lumbar Physical Therapy Progress Note  10/23/17 (Information from last visit unless noted)    Signing Clinician's Name / Credentials   Signing clinician's name / credentials Roberto Blakely, PT, DPT   Session Number   Session Number 5 (Start of Care Date - 9/25/2017)   Progress Note/Recertification   Progress Note Due Date 10/25/17   Progress Note Completed Date 10/23/17   Recertification Due Date 11/20/17   PT Medicare Only G-code   G-code Mobility: Walking & Moving Around   Mobility: Walking & Moving Around   Mobility Current Status,  (eval/re-eval & every progress note) CI: 1-19% impairment   Current Mobility Modifier Rationale Based on clinical judgment and the patient's ability to perform transfers, ambulate, and perform exercises within PT session. Also based on the patient's subjective report of ability to perform functional activities.   Mobility Goal,  (eval/re-eval, every progress note, & discharge) CI: 1-19% impairment   Adult Goals   PT Ortho Eval Goals 1;2;3;4   Ortho Goal 1   Goal Identifier HEP   Goal Description Pt will be independent in HEP in order to achieve and maintain long term treatment goals.   Target Date 10/09/17   Date Met 10/23/17   Ortho Goal 2   Goal Identifier Lifting   Goal Description Pt will be able to lift her grandchildren from the floor with proper lifting technique with a minimal increase in discomfort 1/10.   Target Date 11/20/17   Date Met 10/23/17   Ortho Goal 3   Goal Identifier Standing   Goal Description Pt will be able to stand for 1 hour with a minimal increase in low back pain 1-2/10.   Target Date 11/20/17   Date Met 10/23/17   Subjective Report   Subjective Report Back has been good over the last week. Drove over the weekend and that went well. 0/10 pain currently.    Objective Measures   Objective Measures Objective Measure 1;Objective  "Measure 2   Objective Measure 1   Objective Measure Bon   Details Low   Objective Measure 2   Objective Measure ARRON   Details 0%   Objective Measure 3   Objective Measure Palpation   Details No discomfort with palpation of L3-L5 vertebrae   Treatment Interventions   Interventions Therapeutic Procedure/Exercise;Manual Therapy   Therapeutic Procedure/exercise   Minutes 14   Skilled Intervention Strengthening exercise instruction    Patient Response Performed exercises well with good technique   Treatment Detail Supine knee push x10 B with pelvic tilts with 5 second holds / Bridges x15 with 5 second holds / Supine knee extension with pelvic tilts x10 B /      Manual Therapy   Minutes 15   Skilled Intervention STM / Joint mobilizations / Manual lumbar traction   Patient Response \"That felt pretty good.\"   Treatment Detail PA joint mobilizations grades 1-3 T10-L5 to improve motion / STM to lumbar paraspinals B T10-L5 to reduce tone / Manual lumbar traction to reduce pain and improve flexibility     Education   Learner Patient   Readiness Acceptance   Method Booklet/handout;Explanation;Demonstration   Response Verbalizes Understanding;Demonstrates Understanding   Education Comments VHI handout   Plan   Home program Supine knee extension with pelvic tilt / Bridges / Angry Cat x15 / Lumbar rotations x10 with 10 second holds / Piriformis stretfch seated / Pelvic tilts in standing / Kneeling hip flexor stretch   Updates to plan of care  (From 3/5/2018) Discharged   Comments   Comments  (From 3/5/2018) Pt had done well throughout physical therapy and was not having any pain at her last visit. PT had been put on hold in case of a flare-up in pain, however the patient did not return and it is assumed that she has done well independently. Pt will be discharged to [facility] at this time.   Total Session Time   Timed Code Treatment Minutes 29   Total Treatment Time (sum of timed and untimed services) 29 (1TE 1MT)     Referring " Physician - Princess Vera

## 2018-08-03 ENCOUNTER — OFFICE VISIT (OUTPATIENT)
Dept: FAMILY MEDICINE | Facility: CLINIC | Age: 68
End: 2018-08-03
Payer: COMMERCIAL

## 2018-08-03 VITALS
SYSTOLIC BLOOD PRESSURE: 146 MMHG | BODY MASS INDEX: 35 KG/M2 | WEIGHT: 205 LBS | HEART RATE: 84 BPM | TEMPERATURE: 97.7 F | DIASTOLIC BLOOD PRESSURE: 89 MMHG | OXYGEN SATURATION: 97 % | HEIGHT: 64 IN | RESPIRATION RATE: 12 BRPM

## 2018-08-03 DIAGNOSIS — R22.41 LOCALIZED SWELLING, MASS, OR LUMP OF LOWER EXTREMITY, RIGHT: ICD-10-CM

## 2018-08-03 DIAGNOSIS — L03.115 CELLULITIS OF RIGHT LOWER EXTREMITY: Primary | ICD-10-CM

## 2018-08-03 PROCEDURE — 99214 OFFICE O/P EST MOD 30 MIN: CPT | Performed by: NURSE PRACTITIONER

## 2018-08-03 RX ORDER — CEPHALEXIN 500 MG/1
500 CAPSULE ORAL 2 TIMES DAILY
Qty: 20 CAPSULE | Refills: 0 | Status: SHIPPED | OUTPATIENT
Start: 2018-08-03 | End: 2022-02-07

## 2018-08-03 ASSESSMENT — PAIN SCALES - GENERAL: PAINLEVEL: MODERATE PAIN (5)

## 2018-08-03 NOTE — PATIENT INSTRUCTIONS
Complete full course of antibiotics even if appearance is better    If redness extends beyond borders return to clinic or go to the ER    Schedule ultrasound in [place]    You can try warm pack to area several times daily for 20 minutes.    We will call you next week to see how the leg appears.

## 2018-08-03 NOTE — MR AVS SNAPSHOT
After Visit Summary   8/3/2018    Kay Mendes    MRN: [id number]           Patient Information     Date Of Birth          1950        Visit Information        Provider Department      8/3/2018 9:20 AM Kay Manley APRN CNP [facility]        Today's Diagnoses     Cellulitis of right lower extremity    -  1    Localized swelling, mass, or lump of lower extremity, right          Care Instructions    Complete full course of antibiotics even if appearance is better    If redness extends beyond borders return to clinic or go to the ER    Schedule ultrasound in [place]    You can try warm pack to area several times daily for 20 minutes.    We will call you next week to see how the leg appears.           Follow-ups after your visit        Future tests that were ordered for you today     Open Future Orders        Priority Expected Expires Ordered    VENOUS THROMBOSIS IMAGING Routine  11/1/2018 8/3/2018            Who to contact     If you have questions or need follow up information about today's clinic visit or your schedule please contact [facility] directly at [phone].  Normal or non-critical lab and imaging results will be communicated to you by [employer], letter or phone within 4 business days after the clinic has received the results. If you do not hear from us within 7 days, please contact the clinic through [employer] or phone. If you have a critical or abnormal lab result, we will notify you by phone as soon as possible.  Submit refill requests through [employer] or call your pharmacy and they will forward the refill request to us. Please allow 3 business days for your refill to be completed.          Additional Information About Your Visit        [employer] Information     [employer] gives you secure access to your electronic health record. If you see a primary care provider, you can also send messages to your care team and make appointments. If you have  "questions, please call your primary care clinic.  If you do not have a primary care provider, please call [phone] and they will assist you.        Care EveryWhere ID     This is your Care EveryWhere ID. This could be used by other organizations to access your [facility] medical records  [id number]        Your Vitals Were     Pulse Temperature Respirations Height Pulse Oximetry Breastfeeding?    84 97.7  F (36.5  C) (Tympanic) 12 5' 3.75\" (1.619 m) 97% No    BMI (Body Mass Index)                   35.46 kg/m2            Blood Pressure from Last 3 Encounters:   08/03/18 146/89   09/15/17 134/78   06/13/17 137/89    Weight from Last 3 Encounters:   08/03/18 205 lb (93 kg)   09/15/17 199 lb (90.3 kg)   06/13/17 196 lb 6.4 oz (89.1 kg)                 Today's Medication Changes          These changes are accurate as of 8/3/18  9:57 AM.  If you have any questions, ask your nurse or doctor.               Start taking these medicines.        Dose/Directions    cephALEXin 500 MG capsule   Commonly known as:  KEFLEX   Used for:  Cellulitis of right lower extremity   Started by:  Kay Manley APRN CNP        Dose:  500 mg   Take 1 capsule (500 mg) by mouth 2 times daily   Quantity:  20 capsule   Refills:  0            Where to get your medicines      These medications were sent to [facility] [address] [place] [address] [address]   [address] [facility] [address]     Phone:  [phone]     cephALEXin 500 MG capsule                Primary Care Provider Office Phone # Fax #    Princess Tariq [phone] [address] [facility] [address]        Equal Access to Services     [facility]: Raimundo Simmons, wabenda lujeyson, qaybta kaalmada courtney, aditya guerra. So [facility] [phone].    ATENCIÓN: Si habla español, tiene a yeager disposición servicios gratuitos de asistencia lingüística. Llame al [phone].    We comply with " applicable federal civil rights laws and [place] laws. We do not discriminate on the basis of race, color, national origin, age, disability, sex, sexual orientation, or gender identity.            Thank you!     Thank you for choosing [facility]  for your care. Our goal is always to provide you with excellent care. Hearing back from our patients is one way we can continue to improve our services. Please take a few minutes to complete the written survey that you may receive in the mail after your visit with us. Thank you!             Your Updated Medication List - Protect others around you: Learn how to safely use, store and throw away your medicines at www.disposemymeds.org.          This list is accurate as of 8/3/18  9:57 AM.  Always use your most recent med list.                   Brand Name Dispense Instructions for use Diagnosis    cephALEXin 500 MG capsule    KEFLEX    20 capsule    Take 1 capsule (500 mg) by mouth 2 times daily    Cellulitis of right lower extremity       omeprazole 20 MG tablet     90 tablet    Take 1 tablet (20 mg) by mouth daily Take 30-60 minutes before a meal.    Esophageal reflux       tiZANidine 4 MG tablet    ZANAFLEX    30 tablet    Take 1-2 tablets (4-8 mg) by mouth 3 times daily as needed for muscle spasms    Acute midline low back pain without sciatica

## 2018-08-03 NOTE — PROGRESS NOTES
SUBJECTIVE:   Kay Mendes is a 67 year old female who presents to clinic today for the following health issues:      Concern - pt has red swollen painful area on rt lower leg x 10 days  Onset: 10 days    Description:   Red, swollen, painful to touch    Intensity: moderate, severe    Progression of Symptoms:  worsening and constant    Accompanying Signs & Symptoms:  Red, swollen, painful    Previous history of similar problem:   none    Precipitating factors:   Worsened by: touching    Alleviating factors:  Improved by: none    Therapies Tried and outcome: none      Problem list and histories reviewed & adjusted, as indicated.    Further history obtained, clarified or corrected by provider:  Reports that swelling and warmth of the right inner calf first started about 10 days ago.  There is no pain unless she presses on it firmly.  Area of redness has been gradually expanding.    Patient Active Problem List   Diagnosis     External hemorrhoids     Advanced directives, counseling/discussion     CARDIOVASCULAR SCREENING; LDL GOAL LESS THAN 160     Diverticulosis of sigmoid colon     Adhesive capsulitis of left shoulder     Esophageal reflux     Past Surgical History:   Procedure Laterality Date     COLONOSCOPY  11/30/2012    Procedure: COLONOSCOPY;  Colonoscopy  ;  Surgeon: Fidel Maradiaga MD;  Location: [facility]     HC REMOVAL OF TONSILS,<15 Y/O  1956    Tonsils <14y.o.     SURGICAL HISTORY OF -   1980    wisdom teeth     SURGICAL HISTORY OF -   1993    needle bx lt breast benign       Social History   Substance Use Topics     Smoking status: Never Smoker     Smokeless tobacco: Never Used     Alcohol use Yes      Comment: rare     Family History   Problem Relation Age of Onset     Cancer Father      lung     Cancer Mother      breast, 72 years old     Neurologic Disorder Sister      ataxia-- genetic link 3 siblings( 1 brother, 2 sis) - affects speech and balance      Neurologic Disorder Brother      Neurologic  "Disorder Sister          Current Outpatient Prescriptions   Medication Sig Dispense Refill     cephALEXin (KEFLEX) 500 MG capsule Take 1 capsule (500 mg) by mouth 2 times daily 20 capsule 0     omeprazole 20 MG tablet Take 1 tablet (20 mg) by mouth daily Take 30-60 minutes before a meal. 90 tablet 3     tiZANidine (ZANAFLEX) 4 MG tablet Take 1-2 tablets (4-8 mg) by mouth 3 times daily as needed for muscle spasms (Patient not taking: Reported on 8/3/2018) 30 tablet 0     Labs reviewed in EPIC    Reviewed and updated as needed this visit by clinical staff  Tobacco  Allergies  Meds  Problems  Med Hx  Surg Hx  Fam Hx  Soc Hx        Reviewed and updated as needed this visit by Provider  Allergies  Meds  Problems         ROS:  Constitutional, HEENT, cardiovascular, pulmonary, gi and gu systems are negative, except as otherwise noted.    OBJECTIVE:     /89 (BP Location: Right arm, Patient Position: Chair, Cuff Size: Adult Large)  Pulse 84  Temp 97.7  F (36.5  C) (Tympanic)  Resp 12  Ht 5' 3.75\" (1.619 m)  Wt 205 lb (93 kg)  SpO2 97%  Breastfeeding? No  BMI 35.46 kg/m2  Body mass index is 35.46 kg/(m^2).  GENERAL: healthy, alert and no distress  EYES: Eyes grossly normal to inspection and conjunctivae and sclerae normal  RESP: lungs clear to auscultation - no rales, rhonchi or wheezes  CV: regular rates and rhythm, normal S1 S2, no S3 or S4, no murmur, click or rub and peripheral pulses strong  MS: normal range of motion, Homans sign negative  SKIN: Scattered superficial varicosities noted on bilateral calves.  Right inner calf has a large warm erythematous area which is approximately 10 cm x 8 cm.  This surrounds a large varicosity which is firm to touch at the center.  Right calf is slightly enlarged as compared to left  PSYCH: mentation appears normal, affect normal/bright    Diagnostic Test Results:      ASSESSMENT/PLAN:     ASSESSMENT:  1. Cellulitis of right lower extremity.  Cellulitis " surrounds a firm varicosity.  Borders of erythema are marked.  Will start cephalexin.  Patient to call if redness extends beyond outlined border.   2. Localized swelling, mass, or lump of lower extremity, right.  Appears to be superficial varicosities.  Homans sign negative.  Will obtain Doppler ultrasound.       PLAN:  Orders Placed This Encounter     VENOUS THROMBOSIS IMAGING     cephALEXin (KEFLEX) 500 MG capsule       Patient Instructions   Complete full course of antibiotics even if appearance is better    If redness extends beyond borders return to clinic or go to the ER    Schedule ultrasound in [place]    You can try warm pack to area several times daily for 20 minutes.    We will call you next week to see how the leg appears.   Patient agrees with plan of care as outlined. Call or return to the clinic with any worsening of symptoms or no resolution. Medication side effects reviewed.  Chart documentation with Dragon Voice recognition Software. Although reviewed after completion, some words and grammatical errors may remain.        Kay Manley NP, APRN CNP  [facility]

## 2018-08-08 ENCOUNTER — TELEPHONE (OUTPATIENT)
Dept: FAMILY MEDICINE | Facility: CLINIC | Age: 68
End: 2018-08-08

## 2018-08-08 ENCOUNTER — HOSPITAL ENCOUNTER (OUTPATIENT)
Dept: ULTRASOUND IMAGING | Facility: CLINIC | Age: 68
Discharge: HOME | End: 2018-08-08
Attending: NURSE PRACTITIONER | Admitting: NURSE PRACTITIONER
Payer: MEDICARE

## 2018-08-08 DIAGNOSIS — R22.41 LOCALIZED SWELLING, MASS, OR LUMP OF LOWER EXTREMITY, RIGHT: ICD-10-CM

## 2018-08-08 PROCEDURE — 93971 EXTREMITY STUDY: CPT | Mod: RT

## 2018-08-08 NOTE — TELEPHONE ENCOUNTER
Patient was seen in clinic on 8/3/18.  US was ordered with the results below.    IMPRESSION: Superficial thrombophlebitis is present below the knee, as  described above..      Will send to covering provider for review and advise.    Thank you  Sayra JOHNSON RN

## 2018-08-08 NOTE — TELEPHONE ENCOUNTER
Patient was notified and agrees with plan.  Will send as [employer] message so patient can review it again.  Per patient request.    Sayra JOHNSON RN

## 2018-08-08 NOTE — PROGRESS NOTES
Please call the patient with the results. Notify that there is superficial blood clot seen. This does not need prescription anti-coagulation. I would recommend ASA 325mg TID and heat applied TID-QID for the next 1-2 weeks. Follow-up if not improving or if worsening. Urgent evaluation needed if increased leg swelling/pain or sudden shortness of breath or chest pain.

## 2018-11-27 ENCOUNTER — ALLIED HEALTH/NURSE VISIT (OUTPATIENT)
Dept: FAMILY MEDICINE | Facility: CLINIC | Age: 68
End: 2018-11-27
Payer: COMMERCIAL

## 2018-11-27 DIAGNOSIS — Z23 NEED FOR PROPHYLACTIC VACCINATION AND INOCULATION AGAINST INFLUENZA: Primary | ICD-10-CM

## 2018-11-27 PROCEDURE — G0008 ADMIN INFLUENZA VIRUS VAC: HCPCS

## 2018-11-27 PROCEDURE — 90662 IIV NO PRSV INCREASED AG IM: CPT

## 2018-11-27 PROCEDURE — 90670 PCV13 VACCINE IM: CPT

## 2018-11-27 PROCEDURE — G0009 ADMIN PNEUMOCOCCAL VACCINE: HCPCS

## 2018-11-27 NOTE — PROGRESS NOTES

## 2018-11-27 NOTE — MR AVS SNAPSHOT
After Visit Summary   11/27/2018    Kay Mendes    MRN: [id number]           Patient Information     Date Of Birth          1950        Visit Information        Provider Department      11/27/2018 4:00 PM [facility]        Today's Diagnoses     Need for prophylactic vaccination and inoculation against influenza    -  1       Follow-ups after your visit        Who to contact     If you have questions or need follow up information about today's clinic visit or your schedule please contact [facility] directly at [phone].  Normal or non-critical lab and imaging results will be communicated to you by Swivlhart, letter or phone within 4 business days after the clinic has received the results. If you do not hear from us within 7 days, please contact the clinic through [email or web address] or phone. If you have a critical or abnormal lab result, we will notify you by phone as soon as possible.  Submit refill requests through [employer] or call your pharmacy and they will forward the refill request to us. Please allow 3 business days for your refill to be completed.          Additional Information About Your Visit        MyChart Information     [employer] gives you secure access to your electronic health record. If you see a primary care provider, you can also send messages to your care team and make appointments. If you have questions, please call your primary care clinic.  If you do not have a primary care provider, please call [phone] and they will assist you.        Care EveryWhere ID     This is your Care EveryWhere ID. This could be used by other organizations to access your [facility] medical records  [id number]         Blood Pressure from Last 3 Encounters:   08/03/18 146/89   09/15/17 134/78   06/13/17 137/89    Weight from Last 3 Encounters:   08/03/18 205 lb (93 kg)   09/15/17 199 lb (90.3 kg)   06/13/17 196 lb 6.4 oz (89.1 kg)              We  Performed the Following     ADMIN INFLUENZA (For MEDICARE Patients ONLY) []     ADMIN PNEUMOVAX VACCINE (For MEDICARE Patients ONLY) []     FLU VACCINE, INCREASED ANTIGEN, PRESV FREE, AGE 65+ [75591]     Pneumococcal vaccine 13 valent PCV13 IM (Prevnar) [84072]        Primary Care Provider Office Phone # Fax #    Princess Tariq [phone] [address] [facility] [address]        Equal Access to Services     ABBY POLLARD : Hadii aad ku hadasho Soomaali, waaxda luqadaha, qaybta kaalmada adeegyada, waxay idiin hayaan adeeg kharash la'hilda . So [facility] [phone].    ATENCIÓN: Si habla español, tiene a yeager disposición servicios gratuitos de asistencia lingüística. [facility] [phone].    We comply with applicable federal civil rights laws and [place] laws. We do not discriminate on the basis of race, color, national origin, age, disability, sex, sexual orientation, or gender identity.            Thank you!     Thank you for choosing [facility]  for your care. Our goal is always to provide you with excellent care. Hearing back from our patients is one way we can continue to improve our services. Please take a few minutes to complete the written survey that you may receive in the mail after your visit with us. Thank you!             Your Updated Medication List - Protect others around you: Learn how to safely use, store and throw away your medicines at www.disposemymeds.org.          This list is accurate as of 11/27/18  4:20 PM.  Always use your most recent med list.                   Brand Name Dispense Instructions for use Diagnosis    cephALEXin 500 MG capsule    KEFLEX    20 capsule    Take 1 capsule (500 mg) by mouth 2 times daily    Cellulitis of right lower extremity       omeprazole 20 MG tablet     90 tablet    Take 1 tablet (20 mg) by mouth daily Take 30-60 minutes before a meal.    Esophageal reflux       tiZANidine 4 MG tablet    ZANAFLEX    30  tablet    Take 1-2 tablets (4-8 mg) by mouth 3 times daily as needed for muscle spasms    Acute midline low back pain without sciatica

## 2019-10-02 ENCOUNTER — HEALTH MAINTENANCE LETTER (OUTPATIENT)
Age: 69
End: 2019-10-02

## 2019-12-15 ENCOUNTER — HEALTH MAINTENANCE LETTER (OUTPATIENT)
Age: 69
End: 2019-12-15

## 2021-01-15 ENCOUNTER — HEALTH MAINTENANCE LETTER (OUTPATIENT)
Age: 71
End: 2021-01-15

## 2021-09-04 ENCOUNTER — HEALTH MAINTENANCE LETTER (OUTPATIENT)
Age: 71
End: 2021-09-04

## 2021-10-30 ENCOUNTER — HEALTH MAINTENANCE LETTER (OUTPATIENT)
Age: 71
End: 2021-10-30

## 2022-02-07 ENCOUNTER — OFFICE VISIT (OUTPATIENT)
Dept: FAMILY MEDICINE | Facility: CLINIC | Age: 72
End: 2022-02-07
Payer: MEDICARE

## 2022-02-07 VITALS
BODY MASS INDEX: 36.19 KG/M2 | DIASTOLIC BLOOD PRESSURE: 84 MMHG | TEMPERATURE: 98.3 F | RESPIRATION RATE: 16 BRPM | HEIGHT: 64 IN | WEIGHT: 212 LBS | SYSTOLIC BLOOD PRESSURE: 134 MMHG | HEART RATE: 81 BPM | OXYGEN SATURATION: 98 %

## 2022-02-07 DIAGNOSIS — M54.50 ACUTE RIGHT-SIDED LOW BACK PAIN WITHOUT SCIATICA: Primary | ICD-10-CM

## 2022-02-07 DIAGNOSIS — E66.01 MORBID OBESITY (H): ICD-10-CM

## 2022-02-07 PROCEDURE — 99213 OFFICE O/P EST LOW 20 MIN: CPT | Performed by: FAMILY MEDICINE

## 2022-02-07 RX ORDER — METHOCARBAMOL 500 MG/1
500 TABLET, FILM COATED ORAL 4 TIMES DAILY PRN
Qty: 40 TABLET | Refills: 0 | Status: SHIPPED | OUTPATIENT
Start: 2022-02-07 | End: 2022-02-08

## 2022-02-07 ASSESSMENT — PAIN SCALES - GENERAL: PAINLEVEL: MODERATE PAIN (4)

## 2022-02-07 ASSESSMENT — MIFFLIN-ST. JEOR: SCORE: 1467.66

## 2022-02-07 NOTE — LETTER
[facility]  [address]  [facility] [address]  [phone]        February 7, 2022    Regarding:  Kay Mendes  [address] [facility] [address]              To Whom It May Concern;      Patient was seen in our clinic today with acute back pain.  She may be unable to sit for prolonged periods of time due to this pain.          Sincerely,        Princess Tariq MD

## 2022-02-07 NOTE — PROGRESS NOTES
"  Assessment & Plan     Acute right-sided low back pain without sciatica  Likely due to muscle spasm.   No red flag symptoms  Rest, nsaids, heat, muscle relaxer  No need for advanced imaging at this time.   - methocarbamol (ROBAXIN) 500 MG tablet; Take 1 tablet (500 mg) by mouth 4 times daily as needed for muscle spasms    Morbid obesity (H)  Recommend weight loss        BMI:   Estimated body mass index is 36.68 kg/m  as calculated from the following:    Height as of this encounter: 1.619 m (5' 3.75\").    Weight as of this encounter: 96.2 kg (212 lb).           No follow-ups on file.    Princess Tariq MD  [facility]    Waldemar Villanueva is a 71 year old who presents for the following health issues     HPI   Chief Complaint   Patient presents with     Back Pain     Health Maintenance     mammogram       Pain History:  When did you first notice your pain? - Less than 1 week   Have you seen anyone else for your pain? No  Where in your body do you have pain? Back Pain  Onset/Duration:5-6 days  Description:   Location of pain: low back right  Character of pain: dull ache constant, cramping with certain movements and intermittent  Pain radiation: radiates into the right buttocks and radiates into the right leg  New numbness or weakness in legs, not attributed to pain: no   Intensity: Currently 4/10, At its worst 8/10  Progression of Symptoms: worsening  History:   Specific cause: bending over in a bent position for awhile  Pain interferes with job: not applicable  History of back problems:   Any previous MRI or X-rays: Yes- at [place].   Sees a specialist for back pain: No  Alleviating factors:   Improved by: ice and heat  Precipitating factors:  Worsened by: certain positions    Therapies tried and outcome: cold, heat and sitting    Accompanying Signs & Symptoms:  Risk of Fracture: Age >64  Risk of Cauda Equina: None  Risk of Infection: None  Risk of Cancer: None  Risk of Ankylosing " "Spondylitis: Onset at age <35, male, AND morning back stiffness  no         Review of Systems   Constitutional, HEENT, cardiovascular, pulmonary, gi and gu systems are negative, except as otherwise noted.      Objective    /84   Pulse 81   Temp 98.3  F (36.8  C) (Tympanic)   Resp 16   Ht 1.619 m (5' 3.75\")   Wt 96.2 kg (212 lb)   SpO2 98%   Breastfeeding No   BMI 36.68 kg/m    Body mass index is 36.68 kg/m .  Physical Exam   GENERAL APPEARANCE: healthy, alert and no distress  MS: tender to palpation right lumbar paraspinals.   Range of motion is limited by pain.                 "

## 2022-02-19 ENCOUNTER — HEALTH MAINTENANCE LETTER (OUTPATIENT)
Age: 72
End: 2022-02-19

## 2022-05-03 ENCOUNTER — HOSPITAL ENCOUNTER (OUTPATIENT)
Dept: MAMMOGRAPHY | Facility: CLINIC | Age: 72
Discharge: HOME | End: 2022-05-03
Attending: FAMILY MEDICINE | Admitting: FAMILY MEDICINE
Payer: MEDICARE

## 2022-05-03 DIAGNOSIS — Z12.31 VISIT FOR SCREENING MAMMOGRAM: ICD-10-CM

## 2022-05-03 PROCEDURE — 77067 SCR MAMMO BI INCL CAD: CPT

## 2022-05-25 ENCOUNTER — HOSPITAL ENCOUNTER (OUTPATIENT)
Dept: MAMMOGRAPHY | Facility: CLINIC | Age: 72
Discharge: HOME | End: 2022-05-25
Attending: FAMILY MEDICINE
Payer: MEDICARE

## 2022-05-25 ENCOUNTER — HOSPITAL ENCOUNTER (OUTPATIENT)
Dept: ULTRASOUND IMAGING | Facility: CLINIC | Age: 72
Discharge: HOME | End: 2022-05-25
Attending: FAMILY MEDICINE
Payer: MEDICARE

## 2022-05-25 DIAGNOSIS — R92.8 ABNORMAL MAMMOGRAM: ICD-10-CM

## 2022-05-25 PROCEDURE — 76642 ULTRASOUND BREAST LIMITED: CPT | Mod: LT

## 2022-05-25 PROCEDURE — 77061 BREAST TOMOSYNTHESIS UNI: CPT | Mod: LT

## 2022-12-05 ENCOUNTER — HOSPITAL ENCOUNTER (OUTPATIENT)
Dept: ULTRASOUND IMAGING | Facility: CLINIC | Age: 72
Discharge: HOME | End: 2022-12-05
Attending: FAMILY MEDICINE | Admitting: FAMILY MEDICINE
Payer: MEDICARE

## 2022-12-05 DIAGNOSIS — R92.8 BI-RADS CATEGORY 3 MAMMOGRAM RESULT: ICD-10-CM

## 2022-12-05 PROCEDURE — 76642 ULTRASOUND BREAST LIMITED: CPT | Mod: LT

## 2023-01-18 ENCOUNTER — TELEPHONE (OUTPATIENT)
Dept: FAMILY MEDICINE | Facility: CLINIC | Age: 73
End: 2023-01-18
Payer: MEDICARE

## 2023-01-18 NOTE — TELEPHONE ENCOUNTER
Patient Quality Outreach    Patient is due for the following:   Colonoscopy    Next Steps:   - Schedule annual wellness visit with fasting lab  - Update vaccines  - Complete colonoscopy    Type of outreach:    Sent [employer] message.      Questions for provider review:    None     Faith Ruiz, CMA

## 2023-03-13 ENCOUNTER — OFFICE VISIT (OUTPATIENT)
Dept: FAMILY MEDICINE | Facility: CLINIC | Age: 73
End: 2023-03-13
Payer: MEDICARE

## 2023-03-13 VITALS
TEMPERATURE: 98.5 F | HEIGHT: 64 IN | RESPIRATION RATE: 18 BRPM | OXYGEN SATURATION: 99 % | SYSTOLIC BLOOD PRESSURE: 132 MMHG | WEIGHT: 209.5 LBS | BODY MASS INDEX: 35.77 KG/M2 | HEART RATE: 91 BPM | DIASTOLIC BLOOD PRESSURE: 84 MMHG

## 2023-03-13 DIAGNOSIS — Z01.818 PREOP GENERAL PHYSICAL EXAM: Primary | ICD-10-CM

## 2023-03-13 DIAGNOSIS — H25.9 SENILE CATARACT, UNSPECIFIED AGE-RELATED CATARACT TYPE, UNSPECIFIED LATERALITY: ICD-10-CM

## 2023-03-13 DIAGNOSIS — E66.01 MORBID OBESITY (H): ICD-10-CM

## 2023-03-13 DIAGNOSIS — Z12.11 SCREEN FOR COLON CANCER: ICD-10-CM

## 2023-03-13 DIAGNOSIS — Z23 NEED FOR VACCINATION: ICD-10-CM

## 2023-03-13 PROCEDURE — 90471 IMMUNIZATION ADMIN: CPT | Performed by: FAMILY MEDICINE

## 2023-03-13 PROCEDURE — 90714 TD VACC NO PRESV 7 YRS+ IM: CPT | Performed by: FAMILY MEDICINE

## 2023-03-13 PROCEDURE — 99214 OFFICE O/P EST MOD 30 MIN: CPT | Mod: 25 | Performed by: FAMILY MEDICINE

## 2023-03-13 ASSESSMENT — PAIN SCALES - GENERAL: PAINLEVEL: NO PAIN (0)

## 2023-03-13 NOTE — PROGRESS NOTES
[facility]  [address] [facility] [address]  Phone: [phone]  Primary Provider: Princess Tariq  Pre-op Performing Provider: PRINCESS TARIQ      PREOPERATIVE EVALUATION:  Today's date: 3/13/2023    Kay Mendes is a 72 year old female who presents for a preoperative evaluation.    Surgical Information:  Surgery/Procedure: Cataract removal  Surgery Location: [facility]  Surgeon: Liliana  Surgery Date: 3/17/2023 and 3/24/2023  Time of Surgery: TBD  Where patient plans to recover: At home with family  Fax number for surgical facility: [phone]    Type of Anesthesia Anticipated: to be determined    Assessment & Plan     The proposed surgical procedure is considered LOW risk.    Preop general physical exam       Senile cataract, unspecified age-related cataract type, unspecified laterality   has bilateral IOL replacement scheduled    Screen for colon cancer     - Colonoscopy Screening  Referral; Future           Risks and Recommendations:  The patient has the following additional risks and recommendations for perioperative complications:   - No identified additional risk factors other than previously addressed    Medication Instructions:  Patient is to take all scheduled medications on the day of surgery    RECOMMENDATION:  APPROVAL GIVEN to proceed with proposed procedure, without further diagnostic evaluation.            Subjective     HPI related to upcoming procedure: 71 yo female with GERD here for preoperative evaluation for IOL replacement for bilateral cataracts.         Preop Questions 3/12/2023   1. Have you ever had a heart attack or stroke? No   2. Have you ever had surgery on your heart or blood vessels, such as a stent placement, a coronary artery bypass, or surgery on an artery in your head, neck, heart, or legs? No   3. Do you have chest pain with activity? No   4. Do you have a history of  heart failure? No   5. Do you currently  have a cold, bronchitis or symptoms of other infection? No   6. Do you have a cough, shortness of breath, or wheezing? No   7. Do you or anyone in your family have previous history of blood clots? No   8. Do you or does anyone in your family have a serious bleeding problem such as prolonged bleeding following surgeries or cuts? UNKNOWN -    9. Have you ever had problems with anemia or been told to take iron pills? No   10. Have you had any abnormal blood loss such as black, tarry or bloody stools, or abnormal vaginal bleeding? No   11. Have you ever had a blood transfusion? No   12. Are you willing to have a blood transfusion if it is medically needed before, during, or after your surgery? Yes   13. Have you or any of your relatives ever had problems with anesthesia? No   14. Do you have sleep apnea, excessive snoring or daytime drowsiness? No   15. Do you have any artifical heart valves or other implanted medical devices like a pacemaker, defibrillator, or continuous glucose monitor? No   16. Do you have artificial joints? No   17. Are you allergic to latex? No       Health Care Directive:  Patient does not have a Health Care Directive or Living Will:     Preoperative Review of :   reviewed - no record of controlled substances prescribed.      Status of Chronic Conditions:  See problem list for active medical problems.  Problems all longstanding and stable, except as noted/documented.  See ROS for pertinent symptoms related to these conditions.      Review of Systems  CONSTITUTIONAL: NEGATIVE for fever, chills, change in weight  ENT/MOUTH: NEGATIVE for ear, mouth and throat problems  RESP: NEGATIVE for significant cough or SOB  CV: NEGATIVE for chest pain, palpitations or peripheral edema    Patient Active Problem List    Diagnosis Date Noted     Morbid obesity (H) 02/07/2022     Priority: Medium     Esophageal reflux 02/11/2015     Priority: Medium     Adhesive capsulitis of left shoulder 02/20/2014      "Priority: Medium     Evaluated by [facility] on 02/13/2014 and referred to physical therapy.       Diverticulosis of sigmoid colon 11/30/2012     Priority: Medium     See colonoscopy 11/30/12       Advanced directives, counseling/discussion 06/28/2012     Priority: Medium     Patient does not have an Advance/Health Care Directive (HCD), given \"What is Advance Care Planning?\" flyer.    Bernadette Tariq  June 28, 2012         CARDIOVASCULAR SCREENING; LDL GOAL LESS THAN 160 06/28/2012     Priority: Medium     External hemorrhoids 08/05/2009     Priority: Medium      Past Medical History:   Diagnosis Date     Allergic rhinitis, cause unspecified      Need for prophylactic hormone replacement therapy (postmenopausal)      Other and unspecified hyperlipidemia      Unspecified sinusitis (chronic)      Past Surgical History:   Procedure Laterality Date     BIOPSY BREAST       COLONOSCOPY  11/30/2012    Procedure: COLONOSCOPY;  Colonoscopy  ;  Surgeon: Fidel Maradiaga MD;  Location: [facility]     HC REMOVAL OF TONSILS,<15 Y/O  01[birth year]01/1956    Tonsils <14y.o.     SURGICAL HISTORY OF -   01/01/1980    wisdom teeth     SURGICAL HISTORY OF -   01/01/1993    needle bx lt breast benign     Current Outpatient Medications   Medication Sig Dispense Refill     omeprazole 20 MG tablet Take 1 tablet (20 mg) by mouth daily Take 30-60 minutes before a meal. 90 tablet 3     tiZANidine (ZANAFLEX) 2 MG tablet Take 1 tablet (2 mg) by mouth 3 times daily as needed for muscle spasms 30 tablet 0       No Known Allergies     Social History     Tobacco Use     Smoking status: Never     Smokeless tobacco: Never   Substance Use Topics     Alcohol use: Yes     Comment: rare     Family History   Problem Relation Age of Onset     Cancer Father         lung     Cancer Mother         breast, 72 years old     Neurologic Disorder Sister         ataxia-- genetic link 3 siblings( 1 brother, 2 sis) - affects speech and balance      Neurologic Disorder " "Brother      Neurologic Disorder Sister      History   Drug Use No         Objective     /84   Pulse 91   Temp 98.5  F (36.9  C) (Tympanic)   Resp 18   Ht 1.619 m (5' 3.75\")   Wt 95 kg (209 lb 8 oz)   SpO2 99%   BMI 36.24 kg/m      Physical Exam  GENERAL APPEARANCE: healthy, alert and no distress  HENT: ear canals and TM's normal and nose and mouth without ulcers or lesions  RESP: lungs clear to auscultation - no rales, rhonchi or wheezes  CV: regular rate and rhythm, normal S1 S2, no S3 or S4 and no murmur, click or rub   NEURO: Normal strength and tone, sensory exam grossly normal, mentation intact and speech normal    No results for input(s): HGB, PLT, INR, NA, POTASSIUM, CR, A1C in the last 58410 hours.     Diagnostics:  No labs were ordered during this visit.   No EKG required for low risk surgery (cataract, skin procedure, breast biopsy, etc).    Revised Cardiac Risk Index (RCRI):  The patient has the following serious cardiovascular risks for perioperative complications:   - No serious cardiac risks = 0 points     RCRI Interpretation: 0 points: Class I (very low risk - 0.4% complication rate)           Signed Electronically by: Princess Tariq MD  Copy of this evaluation report is provided to requesting physician.      "

## 2023-03-13 NOTE — PATIENT INSTRUCTIONS
For informational purposes only. Not to replace the advice of your health care provider. Copyright   2003,  [facility]. All rights reserved. Clinically reviewed by Trista Bates MD. [employer] [id number] - REV .  Preparing for Your Surgery  Getting started  A nurse will call you to review your health history and instructions. They will give you an arrival time based on your scheduled surgery time. Please be ready to share:    Your doctor's clinic name and phone number    Your medical, surgical, and anesthesia history    A list of allergies and sensitivities    A list of medicines, including herbal treatments and over-the-counter drugs    Whether the patient has a legal guardian (ask how to send us the papers in advance)  Please tell us if you're pregnant--or if there's any chance you might be pregnant. Some surgeries may injure a fetus (unborn baby), so they require a pregnancy test. Surgeries that are safe for a fetus don't always need a test, and you can choose whether to have one.   If you have a child who's having surgery, please ask for a copy of Preparing for Your Child's Surgery.    Preparing for surgery    Within 10 to 30 days of surgery: Have a pre-op exam (sometimes called an H&P, or History and Physical). This can be done at a clinic or pre-operative center.  ? If you're having a , you may not need this exam. Talk to your care team.    At your pre-op exam, talk to your care team about all medicines you take. If you need to stop any medicines before surgery, ask when to start taking them again.  ? We do this for your safety. Many medicines can make you bleed too much during surgery. Some change how well surgery (anesthesia) drugs work.    Call your insurance company to let them know you're having surgery. (If you don't have insurance, call [phone].)    Call your clinic if there's any change in your health. This includes signs of a cold or flu (sore throat, runny nose,  cough, rash, fever). It also includes a scrape or scratch near the surgery site.    If you have questions on the day of surgery, call your hospital or surgery center.  Eating and drinking guidelines  For your safety: Unless your surgeon tells you otherwise, follow the guidelines below.    Eat and drink as usual until 8 hours before you arrive for surgery. After that, no food or milk.    Drink clear liquids until 2 hours before you arrive. These are liquids you can see through, like water, Gatorade, and Propel Water. They also include plain black coffee and tea (no cream or milk), candy, and breath mints. You can spit out gum when you arrive.    If you drink alcohol: Stop drinking it the night before surgery.    If your care team tells you to take medicine on the morning of surgery, it's okay to take it with a sip of water.  Preventing infection    Shower or bathe the night before and morning of your surgery. Follow the instructions your clinic gave you. (If no instructions, use regular soap.)    Don't shave or clip hair near your surgery site. We'll remove the hair if needed.    Don't smoke or vape the morning of surgery. You may chew nicotine gum up to 2 hours before surgery. A nicotine patch is okay.  ? Note: Some surgeries require you to completely quit smoking and nicotine. Check with your surgeon.    Your care team will make every effort to keep you safe from infection. We will:  ? Clean our hands often with soap and water (or an alcohol-based hand rub).  ? Clean the skin at your surgery site with a special soap that kills germs.  ? Give you a special gown to keep you warm. (Cold raises the risk of infection.)  ? Wear special hair covers, masks, gowns and gloves during surgery.  ? Give antibiotic medicine, if prescribed. Not all surgeries need antibiotics.  What to bring on the day of surgery    Photo ID and insurance card    Copy of your health care directive, if you have one    Glasses and hearing aids (bring  cases)  ? You can't wear contacts during surgery    Inhaler and eye drops, if you use them (tell us about these when you arrive)    CPAP machine or breathing device, if you use them    A few personal items, if spending the night    If you have . . .  ? A pacemaker, ICD (cardiac defibrillator) or other implant: Bring the ID card.  ? An implanted stimulator: Bring the remote control.  ? A legal guardian: Bring a copy of the certified (court-stamped) guardianship papers.  Please remove any jewelry, including body piercings. Leave jewelry and other valuables at home.  If you're going home the day of surgery    You must have a responsible adult drive you home. They should stay with you overnight as well.    If you don't have someone to stay with you, and you aren't safe to go home alone, we may keep you overnight. Insurance often won't pay for this.  After surgery  If it's hard to control your pain or you need more pain medicine, please call your surgeon's office.  Questions?   If you have any questions for your care team, list them here: _________________________________________________________________________________________________________________________________________________________________________ ____________________________________ ____________________________________ ____________________________________

## 2023-04-01 ENCOUNTER — HEALTH MAINTENANCE LETTER (OUTPATIENT)
Age: 73
End: 2023-04-01

## 2023-04-06 ENCOUNTER — E-VISIT (OUTPATIENT)
Dept: FAMILY MEDICINE | Facility: CLINIC | Age: 73
End: 2023-04-06
Payer: MEDICARE

## 2023-04-06 DIAGNOSIS — M54.50 ACUTE RIGHT-SIDED LOW BACK PAIN WITHOUT SCIATICA: ICD-10-CM

## 2023-04-06 PROCEDURE — 99421 OL DIG E/M SVC 5-10 MIN: CPT | Performed by: FAMILY MEDICINE

## 2023-04-06 RX ORDER — METHOCARBAMOL 500 MG/1
500 TABLET, FILM COATED ORAL 4 TIMES DAILY PRN
Qty: 40 TABLET | Refills: 0 | Status: SHIPPED | OUTPATIENT
Start: 2023-04-06 | End: 2024-07-03

## 2023-04-07 ENCOUNTER — HOSPITAL ENCOUNTER (EMERGENCY)
Facility: CLINIC | Age: 73
Discharge: HOME | End: 2023-04-07
Attending: FAMILY MEDICINE | Admitting: FAMILY MEDICINE
Payer: MEDICARE

## 2023-04-07 VITALS
HEIGHT: 64 IN | HEART RATE: 83 BPM | BODY MASS INDEX: 35.85 KG/M2 | WEIGHT: 210 LBS | OXYGEN SATURATION: 98 % | SYSTOLIC BLOOD PRESSURE: 202 MMHG | DIASTOLIC BLOOD PRESSURE: 124 MMHG | TEMPERATURE: 98.1 F

## 2023-04-07 DIAGNOSIS — M54.16 LUMBAR RADICULOPATHY: ICD-10-CM

## 2023-04-07 PROCEDURE — 99284 EMERGENCY DEPT VISIT MOD MDM: CPT | Performed by: FAMILY MEDICINE

## 2023-04-07 PROCEDURE — 250N000012 HC RX MED GY IP 250 OP 636 PS 637: Performed by: FAMILY MEDICINE

## 2023-04-07 PROCEDURE — 250N000013 HC RX MED GY IP 250 OP 250 PS 637: Performed by: FAMILY MEDICINE

## 2023-04-07 RX ORDER — OXYCODONE HYDROCHLORIDE 5 MG/1
10 TABLET ORAL ONCE
Status: COMPLETED | OUTPATIENT
Start: 2023-04-07 | End: 2023-04-07

## 2023-04-07 RX ORDER — OXYCODONE HYDROCHLORIDE 5 MG/1
5 TABLET ORAL EVERY 6 HOURS PRN
Qty: 10 TABLET | Refills: 0 | Status: SHIPPED | OUTPATIENT
Start: 2023-04-07 | End: 2023-04-13

## 2023-04-07 RX ORDER — GABAPENTIN 300 MG/1
300 CAPSULE ORAL AT BEDTIME
Qty: 30 CAPSULE | Refills: 0 | Status: SHIPPED | OUTPATIENT
Start: 2023-04-07 | End: 2024-07-03

## 2023-04-07 RX ORDER — LIDOCAINE 4 G/G
1 PATCH TOPICAL
Status: DISCONTINUED | OUTPATIENT
Start: 2023-04-07 | End: 2023-04-07 | Stop reason: HOSPADM

## 2023-04-07 RX ADMIN — OXYCODONE 10 MG: 5 TABLET ORAL at 11:23

## 2023-04-07 RX ADMIN — DEXAMETHASONE 10 MG: 2 TABLET ORAL at 11:23

## 2023-04-07 RX ADMIN — LIDOCAINE 1 PATCH: 560 PATCH PERCUTANEOUS; TOPICAL; TRANSDERMAL at 11:23

## 2023-04-07 ASSESSMENT — ENCOUNTER SYMPTOMS
BLOOD IN STOOL: 0
FREQUENCY: 0
SINUS PRESSURE: 0
WHEEZING: 0
VOMITING: 0
DIAPHORESIS: 0
ABDOMINAL PAIN: 0
NAUSEA: 0
HEADACHES: 0
PALPITATIONS: 0
SORE THROAT: 0
DYSURIA: 0
CONSTIPATION: 0
SHORTNESS OF BREATH: 0
CHILLS: 0
DIARRHEA: 0
COUGH: 0
FEVER: 0

## 2023-04-07 ASSESSMENT — ACTIVITIES OF DAILY LIVING (ADL)
ADLS_ACUITY_SCORE: 35

## 2023-04-07 NOTE — DISCHARGE INSTRUCTIONS
ICD-10-CM    1. Lumbar radiculopathy  M54.16     take tylenol 650 mg every 6 hours scheduled/.  ibuprofen could be used sparingly up to 400 mg twice daily - but risk of ulcers and kidney injury.  maintain back range of motion.  if you take the robaxin exercise caution as we discussed.  you were given decadron here.  if it offers benefit, another dose could be considered monday. oxycodone for breakthrough pain. use miralax as needde fotr constipation. start neurontin

## 2023-04-07 NOTE — ED TRIAGE NOTES
Pt c/o low back pain with pain radiating down right leg.  No numbness/tingling.  No bowel or bladder issues.   Triage Assessment     Row Name 04/07/23 0947       Triage Assessment (Adult)    Airway WDL WDL       Respiratory WDL    Respiratory WDL WDL       Skin Circulation/Temperature WDL    Skin Circulation/Temperature WDL WDL       Cardiac WDL    Cardiac WDL WDL       Peripheral/Neurovascular WDL    Peripheral Neurovascular WDL WDL       Cognitive/Neuro/Behavioral WDL    Cognitive/Neuro/Behavioral WDL WDL

## 2023-04-07 NOTE — ED PROVIDER NOTES
"  History     Chief Complaint   Patient presents with     Back Pain     HPI  Kay Mendes is a 72 year old female who presents with a history of diverticulosis esophageal reflux.  She has had back pain in the past last year had some Robaxin leftover and when she had recurrent low back pain onset about a month ago after she traveled to [place] and played golf had experienced low back pain and buttock pain -use the Robaxin as well as Tylenol.  Starting 2 days ago she began to develop radicular symptoms into the lateral thigh rating down into the lateral calf.  This was not associated with any cauda equina symptoms as noted below.  No weakness.  No fever.  No cancer history for her.  No trauma or falls.      Denies recent prolonged travel (>3 hours) by car or plane, history or FHx of venous thromboembolism, recent surgery (last 4 weeks), active cancer history, hypercoagulable state, estrogen or other medications/conditions causing VTE or  new unilateral swelling or pain in the legs or calves.      Allergies:  No Known Allergies    Problem List:    Patient Active Problem List    Diagnosis Date Noted     Morbid obesity (H) 02/07/2022     Priority: Medium     Esophageal reflux 02/11/2015     Priority: Medium     Adhesive capsulitis of left shoulder 02/20/2014     Priority: Medium     Evaluated by [facility] on 02/13/2014 and referred to physical therapy.       Diverticulosis of sigmoid colon 11/30/2012     Priority: Medium     See colonoscopy 11/30/12       Advanced directives, counseling/discussion 06/28/2012     Priority: Medium     Patient does not have an Advance/Health Care Directive (HCD), given \"What is Advance Care Planning?\" flyer.    Bernadette Tariq  June 28, 2012         CARDIOVASCULAR SCREENING; LDL GOAL LESS THAN 160 06/28/2012     Priority: Medium     External hemorrhoids 08/05/2009     Priority: Medium        Past Medical History:    Past Medical History:   Diagnosis Date     Allergic rhinitis, cause " unspecified      Need for prophylactic hormone replacement therapy (postmenopausal)      Other and unspecified hyperlipidemia      Unspecified sinusitis (chronic)        Past Surgical History:    Past Surgical History:   Procedure Laterality Date     BIOPSY BREAST       COLONOSCOPY  11/30/2012    Procedure: COLONOSCOPY;  Colonoscopy  ;  Surgeon: Fidel Maradiaga MD;  Location: [facility]     HC REMOVAL OF TONSILS,<13 Y/O  01/01/1956    Tonsils <14y.o.     SURGICAL HISTORY OF -   01/01/1980    wisdom teeth     SURGICAL HISTORY OF -   01/01/1993    needle bx lt breast benign       Family History:    Family History   Problem Relation Age of Onset     Cancer Father         lung     Cancer Mother         breast, 72 years old     Neurologic Disorder Sister         ataxia-- genetic link 3 siblings( 1 brother, 2 sis) - affects speech and balance      Neurologic Disorder Brother      Neurologic Disorder Sister        Social History:  Marital Status:   [5]  Social History     Tobacco Use     Smoking status: Never     Smokeless tobacco: Never   Vaping Use     Vaping status: Never Used   Substance Use Topics     Alcohol use: Yes     Comment: rare     Drug use: No        Medications:    methocarbamol (ROBAXIN) 500 MG tablet  omeprazole 20 MG tablet          Review of Systems   Constitutional: Negative for chills, diaphoresis and fever.   HENT: Negative for ear pain, sinus pressure and sore throat.    Eyes: Negative for visual disturbance.   Respiratory: Negative for cough, shortness of breath and wheezing.    Cardiovascular: Negative for chest pain and palpitations.   Gastrointestinal: Negative for abdominal pain, blood in stool, constipation, diarrhea, nausea and vomiting.   Genitourinary: Negative for dysuria, frequency and urgency.   Skin: Negative for rash.   Neurological: Negative for headaches.   All other systems reviewed and are negative.      Physical Exam   BP: (!) 202/124  Pulse: 83  Temp: 98.1  F (36.7  " C)  Height: 162.6 cm (5' 4\")  Weight: 95.3 kg (210 lb)  SpO2: 98 %      Physical Exam  Constitutional:       General: She is in acute distress.      Appearance: She is not diaphoretic.   HENT:      Head: Atraumatic.   Eyes:      Conjunctiva/sclera: Conjunctivae normal.   Cardiovascular:      Rate and Rhythm: Normal rate and regular rhythm.      Heart sounds: No murmur heard.  Pulmonary:      Effort: Pulmonary effort is normal. No respiratory distress.      Breath sounds: Normal breath sounds. No stridor. No wheezing or rhonchi.   Abdominal:      General: Abdomen is flat. There is no distension.      Palpations: Abdomen is soft. There is no mass.      Tenderness: There is no abdominal tenderness. There is no guarding.   Musculoskeletal:      Cervical back: Neck supple.      Right lower leg: No edema.      Left lower leg: No edema.   Skin:     Coloration: Skin is not pale.      Findings: No rash.   Neurological:      General: No focal deficit present.      Mental Status: She is alert.      Motor: No weakness.        Tenderness to palpation at the lateral hip and low back.  Possibly at the buttock region.  No associated rash signs of shingles.  She has reduced painful range of motion of the low back.  There is no obvious weakness great toes in dorsiflexion plantarflexion.  Normal pulses dorsalis pedis posterior tibial normal distal coloration.  Normal distal sensation.  Straight leg raise does elicit pain on the right side and there are  some cross symptoms with raising the left leg.  She also has reflexes that are symmetric bilateral patella and bilateral Achilles both 2+    ED Course                 Procedures              Critical Care time:  none               No results found for this or any previous visit (from the past 24 hour(s)).    Medications   Lidocaine (LIDOCARE) 4 % Patch 1 patch (1 patch Transdermal $Patch/Med Applied 4/7/23 9298)   lidocaine patch in PLACE (has no administration in time range) "   dexamethasone (DECADRON) tablet 10 mg (10 mg Oral $Given 4/7/23 1123)   oxyCODONE (ROXICODONE) tablet 10 mg (10 mg Oral $Given 4/7/23 1123)       Assessments & Plan (with Medical Decision Making)     MDM: Kay Mendes is a 72 year old female presenting with lumbar radiculopathy-like symptoms.  Pain rating down an L4-L5 distribution into the calf.  Longer standing back pain but the lumbar radiculopathy is more acute.  No obvious motor deficits or cauda equina symptoms other red flags no significant trauma.  We will need imaging of some sort at the very least plain film x-ray given advanced age with back pain but that did not need to occur in the emergency department and we discussed following up in clinic.  Consider MRI if persistent pain or red flag findings.  I have written consultation for the follow-up.  I did discussed management as below.  I have given precautions for return.  I have reviewed the nursing notes.    I have reviewed the findings, diagnosis, plan and need for follow up with the patient.           Medical Decision Making  The patient's presentation was of moderate complexity (a chronic illness mild to moderate exacerbation, progression, or side effect of treatment).    The patient's evaluation involved:  history and exam without other [facility] data elements    The patient's management necessitated moderate risk (prescription drug management including medications given in the ED).        New Prescriptions    No medications on file       Final diagnoses:   Lumbar radiculopathy - take tylenol 650 mg every 6 hours scheduled/.  ibuprofen could be used sparingly up to 400 mg twice daily - but risk of ulcers and kidney injury.  maintain back range of motion.  if you take the robaxin exercise caution as we discussed.  you were given decadron here.  if it offers benefit, another dose could be considered monday. oxycodone for breakthrough pain. use miralax as needde fotr constipation. start neurontin        4/7/2023   [facility] EMERGENCY DEPT     Michoacano Walker MD  04/07/23 1321

## 2023-04-10 ENCOUNTER — PATIENT OUTREACH (OUTPATIENT)
Dept: FAMILY MEDICINE | Facility: CLINIC | Age: 73
End: 2023-04-10
Payer: MEDICARE

## 2023-04-10 NOTE — TELEPHONE ENCOUNTER
What type of discharge? Emergency Department  Risk of Hospital admission or ED visit: 39%  Is a TCM episode required? No  When should the patient follow up with PCP? 14 days of discharge.     Pat Lewis RN on 4/10/2023 at 8:55 AM

## 2023-04-11 NOTE — TELEPHONE ENCOUNTER
"ED/Discharge Protocol    Patient states her back pain is better managed today and rates pain 5 out of 10. She was given dexamethasone (DECADRON) tablet 10 mg on 4/7/23 in the ED. She feels she is not needing a steroid today.     Discharge Instructions    \"Let's review your discharge instructions.  What is/are the follow-up recommendations?  Pt. Response: patient confirms understanding of discharge instructions.    \"Were you instructed to make a follow-up appointment?\"  Pt. Response: Yes.  Has appointment been made?   Yes      \"When you see the provider, I would recommend that you bring your discharge instructions with you.    Medications    \"How many new medications are you on since your hospitalization/ED visit?\"    2 or more   \"How many of your current medicines changed (dose, timing, name, etc.) while you were in the hospital/ED visit?\"   2 or more  \"Do you have questions about your medications?\"   No  Call Summary    \"Do you have any questions or concerns about your condition or care plan at the moment?\"    No  Triage nurse advice given: reviewed ED AVS instructions and patient confirms understanding    \"If you have questions or things don't continue to improve, we encourage you contact us through the main clinic number,  [phone].  Even if the clinic is not open, triage nurses are available 24/7 to help you.     We would like you to know that our clinic has extended hours (provide information).  We also have urgent care (provide details on closest location and hours/contact info)\"      \"Thank you for your time and take care!\"    Katelyn Gillis RN on 4/11/2023 at 3:01 PM    "

## 2023-04-13 ENCOUNTER — OFFICE VISIT (OUTPATIENT)
Dept: FAMILY MEDICINE | Facility: CLINIC | Age: 73
End: 2023-04-13
Payer: MEDICARE

## 2023-04-13 VITALS
BODY MASS INDEX: 36.06 KG/M2 | RESPIRATION RATE: 20 BRPM | OXYGEN SATURATION: 98 % | WEIGHT: 211.25 LBS | DIASTOLIC BLOOD PRESSURE: 90 MMHG | SYSTOLIC BLOOD PRESSURE: 154 MMHG | HEIGHT: 64 IN | HEART RATE: 89 BPM | TEMPERATURE: 98.2 F

## 2023-04-13 DIAGNOSIS — M54.16 LUMBAR RADICULOPATHY: Primary | ICD-10-CM

## 2023-04-13 PROCEDURE — 99214 OFFICE O/P EST MOD 30 MIN: CPT | Performed by: FAMILY MEDICINE

## 2023-04-13 RX ORDER — OXYCODONE HYDROCHLORIDE 5 MG/1
5 TABLET ORAL EVERY 6 HOURS PRN
Qty: 10 TABLET | Refills: 0 | Status: SHIPPED | OUTPATIENT
Start: 2023-04-13 | End: 2024-07-03

## 2023-04-13 RX ORDER — METHYLPREDNISOLONE 4 MG
TABLET, DOSE PACK ORAL
Qty: 21 TABLET | Refills: 0 | Status: SHIPPED | OUTPATIENT
Start: 2023-04-13 | End: 2024-07-03

## 2023-04-13 ASSESSMENT — PAIN SCALES - GENERAL: PAINLEVEL: SEVERE PAIN (7)

## 2023-04-13 NOTE — PROGRESS NOTES
"  Assessment & Plan     Lumbar radiculopathy  Can continue the methocarbamol prn  Medrol added today  Tylenol 1000 mg three times daily  Gabapentin 300 mg at bedtime    - methylPREDNISolone (MEDROL DOSEPAK) 4 MG tablet therapy pack; Follow Package Directions  - Physical Therapy Referral; Future  - oxyCODONE (ROXICODONE) 5 MG tablet; Take 1 tablet (5 mg) by mouth every 6 hours as needed for severe pain           MED REC REQUIRED  Post Medication Reconciliation Status: discharge medications reconciled, continue medications without change  BMI:   Estimated body mass index is 36.26 kg/m  as calculated from the following:    Height as of this encounter: 1.626 m (5' 4\").    Weight as of this encounter: 95.8 kg (211 lb 4 oz).           Princess Tariq MD  [facility]   Kay is a 72 year old, presenting for the following health issues:  Back Pain      HPI     ED/UC Followup:    Facility:  [facility]  Date of visit: 4/7/2023  Reason for visit: Lumbar radiculopathy  Current Status: She is taking Gabapentin 300mg at bedtime and oxycodone 5mg bid, methocarbamol 500mg qid and Tylenol PRN with improvement but pain flares once medications wear off.  Pain is worse when standing, bending over, sitting or walking and is a throbbing pain.          Review of Systems   Constitutional, HEENT, cardiovascular, pulmonary, gi and gu systems are negative, except as otherwise noted.      Objective    BP (!) 154/90   Pulse 89   Temp 98.2  F (36.8  C) (Tympanic)   Resp 20   Ht 1.626 m (5' 4\")   Wt 95.8 kg (211 lb 4 oz)   SpO2 98%   BMI 36.26 kg/m    Body mass index is 36.26 kg/m .  Physical Exam   GENERAL APPEARANCE: healthy, alert and no distress  Comprehensive back pain exam:  Tenderness of right SI, piriformis, Range of motion not limited by pain, Lower extremity strength functional and equal on both sides, Lower extremity reflexes within normal limits bilaterally, Lower " extremity sensation normal and equal on both sides and Straight leg positive on  right, indicating possible ipsilateral radiculopathy

## 2023-04-18 ENCOUNTER — E-VISIT (OUTPATIENT)
Dept: FAMILY MEDICINE | Facility: CLINIC | Age: 73
End: 2023-04-18
Payer: MEDICARE

## 2023-04-18 DIAGNOSIS — M54.16 LUMBAR RADICULOPATHY: Primary | ICD-10-CM

## 2023-04-18 PROCEDURE — 99421 OL DIG E/M SVC 5-10 MIN: CPT | Performed by: FAMILY MEDICINE

## 2023-04-19 NOTE — PATIENT INSTRUCTIONS
Caring for Your Back    You are not alone.    Low back pain is very common. Nearly half of all adults will have low back pain in any given year. The good news is that back pain is rarely a danger to your health. Most people can manage their back pain on their own. About half of people start feeling better within two weeks. In 9 out of 10 cases, low back pain goes away or no longer limits daily activity within 6 weeks.     Your outlook is good!     Your symptoms tell us that your low back pain is most likely not a danger to you. Most of the time we will not know the exact cause of low back pain, even if you see a doctor or have an MRI. However, treatment can still work without knowing the cause of the pain. Less than 1 in 100 people need surgery for their back pain.     What can I do about my low back pain?     There are three basic things you can do to ease low back pain and help it go away.     Use heat or cold packs.    Take medicine as directed.    Use positions, movements and exercises.    Using heat or cold packs:    Try cold packs or gentle heat to ease your pain.  Use whichever gives you the most relief. Apply the cold pack or heat for 15 minutes at a time, as often as needed.    Taking medicine:    If taking over-the-counter medicine:    Take ibuprofen (Advil, Motrin) 600 mg three times a day as needed for pain.  OR    Take Aleve (naproxen) 220 to 440 mg two times a day as needed for pain. If your doctor prescribed a muscle relaxant (cyclobenzaprine 10 mg.):    Take   to 1 tablet at bedtime.    Do not drive when taking this medicine. This drug may make you sleepy.     Using positions, movements and exercises:    Research tells us that moving your joints and muscles can help you recover from back pain. Such activity should be simple and gentle. Use the positions below as well as walking to help relieve your pain. Try taking a short walk every 3 to 4 hours during the day. Walk for a few minutes inside your  home or take longer walks outside, on a treadmill or at a mall. Slowly increase the amount of time you walk. Expect discomfort when you begin, but it should lessen as your back starts to heal. When your back feels better, walk daily to keep your back and body healthy.    Finding a position that is comfortable:    When your back pain is new, certain positions will ease your pain. Gently try each of the positions below until you find one that is helpful. Once you find a position of comfort, use it as often as you like when you are resting. You will recover faster if you combine rest with activity.    * Lie on your back with your legs bent. You can do this by placing a pillow under your knees or lie on the floor and rest your lower legs on the seat of a chair.  * Lie on your side with your knees bent and place a pillow between your knees.    Lie on your stomach over pillows.       When should I call my doctor?    Your back pain should improve over the first couple of weeks. As it improves, you should be able to return to your normal activities.  But call your doctor if:      You have a sudden change in your ability to control your bladder or bowels.    You begin to feel tingling in your groin or legs.    The pain spreads down your leg and into your foot.    Your toes, feet or leg muscles begin to feel weak.    You feel generally unwell or sick.    Your pain gets worse.    If you are deaf or hard of hearing, please let us know. We provide many free services including sign language interpreters, oral interpreters, TTYs, telephone amplifiers, note takers and written materials.    For informational purposes only. Not to replace the advice of your health care provider. Copyright   2013 [facility]. All rights reserved. [employer] [id number] - 04/13.

## 2023-04-25 ENCOUNTER — HOSPITAL ENCOUNTER (OUTPATIENT)
Dept: PHYSICAL THERAPY | Facility: CLINIC | Age: 73
Setting detail: THERAPIES SERIES
Discharge: HOME | End: 2023-04-25
Attending: FAMILY MEDICINE
Payer: MEDICARE

## 2023-04-25 DIAGNOSIS — M54.16 LUMBAR RADICULOPATHY: ICD-10-CM

## 2023-04-25 PROCEDURE — 97161 PT EVAL LOW COMPLEX 20 MIN: CPT | Mod: GP | Performed by: PHYSICAL THERAPIST

## 2023-04-25 PROCEDURE — 97110 THERAPEUTIC EXERCISES: CPT | Mod: GP | Performed by: PHYSICAL THERAPIST

## 2023-04-25 PROCEDURE — 97140 MANUAL THERAPY 1/> REGIONS: CPT | Mod: GP | Performed by: PHYSICAL THERAPIST

## 2023-04-25 NOTE — PROGRESS NOTES
[facility]    OUTPATIENT PHYSICAL THERAPY ORTHOPEDIC EVALUATION  PLAN OF TREATMENT FOR OUTPATIENT REHABILITATION  (COMPLETE FOR INITIAL CLAIMS ONLY)  Patient's Last Name, First Name, M.I.  YOB: 1950  Kay Mendes    Provider s Name:  [facility]   Medical Record No.  [id number]   Start of Care Date:  04/25/23   Onset Date:  04/07/23   Type:     _X__PT   ___OT   ___SLP Medical Diagnosis:  Lumbar radiculopathy (M54.16)     PT Diagnosis:  s/s lumbar radiculopathy and R sciatic nerve dysfunction   Visits from SOC:  1      _________________________________________________________________________________  Plan of Treatment/Functional Goals:  balance training, gait training, joint mobilization, manual therapy, ROM, strengthening, stretching           Goals  Goal Identifier: 1.  Goal Description: Patient will note a 50% decrease in symptoms down her leg.  Target Date: 05/30/23    Goal Identifier: 2.  Goal Description: Patient will tolerate transitioning from sitting<>standing and completing ADLs with <2/10 pain.  Target Date: 05/30/23    Goal Identifier: 3.  Goal Description: Patient will tolerate bending and picking up object with pain <2/10.  Target Date: 06/20/23    Goal Identifier: 4.  Goal Description: Nialltent will tolerate standing and walking for >45 minutes with LRAD.  Target Date: 07/18/23       Therapy Frequency:  2 times/Week  Predicted Duration of Therapy Intervention:  12 weeks (weaning to 1x/week)    Ivette Grimes, PT                 I CERTIFY THE NEED FOR THESE SERVICES FURNISHED UNDER        THIS PLAN OF TREATMENT AND WHILE UNDER MY CARE     (Physician co-signature of this document indicates review and certification of the therapy plan).                       Certification Date From:  04/25/23   Certification Date To:  07/18/23    Referring Provider:   Princess Tariq MD    Initial Assessment        See Epic Evaluation Start of Care Date: 04/25/23

## 2023-04-25 NOTE — PROGRESS NOTES
04/25/23 1000   General Information   Type of Visit Initial OP Ortho PT Evaluation   Start of Care Date 04/25/23   Referring Physician Princess Tariq MD   Patient/Family Goals Statement decrease pain, improve walking and sitting   Orders Evaluate and Treat   Date of Order 04/13/23   Certification Required? Yes   Medical Diagnosis Lumbar radiculopathy (M54.16)   Surgical/Medical history reviewed Yes   Precautions/Limitations no known precautions/limitations   Body Part(s)   Body Part(s) Lumbar Spine/SI   Presentation and Etiology   Pertinent history of current problem (include personal factors and/or comorbidities that impact the POC) Patient states that she initially hurt her low back in January shoveling snow. It felt a little better than she went golfing in [place] and it got worse. Now having shooting pain down the back of the R buttock, posterior thigh, down into the foot. Worse with getting in/out of car, standing, walking, and sitting. Recently walking with SEC and step to pattern up/down steps.   Impairments A. Pain;B. Decreased WB tolerance;D. Decreased ROM;F. Decreased strength and endurance;G. Impaired balance;H. Impaired gait   Functional Limitations perform activities of daily living;perform desired leisure / sports activities   Symptom Location posterior right buttock, thigh, down into R foot   How/Where did it occur Other  (shoveling snow)   Onset date of current episode/exacerbation 04/07/23   Chronicity Chronic   Pain rating (0-10 point scale) Best (/10);Worst (/10)   Best (/10) 4   Worst (/10) 10   Pain quality B. Dull;A. Sharp;C. Aching;E. Shooting;G. Cramping   Frequency of pain/symptoms A. Constant   Pain/symptoms are: The same all the time   Pain/symptoms exacerbated by A. Sitting;B. Walking;C. Lifting;D. Carrying;H. Overhead reach;I. Bending;J. ADL;K. Home tasks   Pain/symptoms eased by G. Heat;H. Cold;I. OTC medication(s)   Progression of symptoms since onset: Worsened   Prior Level of  Function   Prior Level of Function-Mobility ind   Prior Level of Function-ADLs ind   Functional Level Prior Comment ind   Current Level of Function   Current Community Support Other  (lives alone, family nearby)   Patient role/employment history F. Retired   Living environment House/townhome   Home/community accessibility steps in home with 3 stories   Current equipment-Gait/Locomotion Standard cane   Fall Risk Screen   Fall screen completed by PT   Have you fallen 2 or more times in the past year? No   Have you fallen and had an injury in the past year? No   Is patient a fall risk? No   Fall screen comments circumstantial, fell on ice   System Outcome Measures   Outcome Measures Low Back Pain (see Oswestry and Bon)   Lumbar Spine/SI Objective Findings   Gait/Locomotion slow, antalgic gait, cane in RUE and PT educated benefit on placing SEC in LUE   Balance/Proprioception (Single Leg Stance) unable to stand on RLE   Hamstring Flexibility slightly limited with pain   Lumbar/SI Flexibility Comments difficult to assess d/t pain, patient could not comfortably get on her back   Flexion ROM moderately limited secondary to pain   Extension ROM moderately limited secondary to pain   Right Side Bending ROM WNL   Left Side Bending ROM WNL, pain when returning to neutral   Hip Screen unable to assess d/t pain   Hip Flexion (L2) Strength 3 on R   Hip Abduction Strength 3+ on R   Hip Adduction Strength 3+ on R   Hip Extension Strength unable to assess   Knee Flexion Strength DNT   Knee Extension (L3) Strength 3   Ankle Dorsiflexion (L4) Strength 4   Lumbar/Hip/Knee/Foot Strength Comments pain on RLE with all movement   Palpation TTP R glute, piriformis, felt relief with lateral pelvis compression seated and sidelying   Slump Test +   Observation very slow and guarded movement   Posture flexed at the hips   Planned Therapy Interventions   Planned Therapy Interventions balance training;gait training;joint mobilization;manual  therapy;ROM;strengthening;stretching   Clinical Impression   Criteria for Skilled Therapeutic Interventions Met yes, treatment indicated   PT Diagnosis s/s lumbar radiculopathy and R sciatic nerve dysfunction   Influenced by the following impairments pain, decr ROM, strength   Functional limitations due to impairments walking, sitting, laying, lifting, carrying, stairs   Clinical Presentation Stable/Uncomplicated   Clinical Presentation Rationale clinical judgement   Clinical Decision Making (Complexity) Low complexity   Therapy Frequency 2 times/Week   Predicted Duration of Therapy Intervention (days/wks) 12 weeks  (weaning to 1x/week)   Risk & Benefits of therapy have been explained Yes   Patient, Family & other staff in agreement with plan of care Yes   Clinical Impression Comments Patient presents to therapy with significant sciatic nerve pain that starts on the R buttock and radiates posteriorly on RLE with most movements. Patient would benefit from continued skilled therapy to address functional limitaitons and improve QOL.   ORTHO GOALS   PT Ortho Eval Goals 1;2;3;4   Ortho Goal 1   Goal Identifier 1.   Goal Description Patient will note a 50% decrease in symptoms down her leg.   Target Date 05/30/23   Ortho Goal 2   Goal Identifier 2.   Goal Description Patient will tolerate transitioning from sitting<>standing and completing ADLs with <2/10 pain.   Target Date 05/30/23   Ortho Goal 3   Goal Identifier 3.   Goal Description Patient will tolerate bending and picking up object with pain <2/10.   Target Date 06/20/23   Ortho Goal 4   Goal Identifier 4.   Goal Description Paitent will tolerate standing and walking for >45 minutes with LRAD.   Target Date 07/18/23   Total Evaluation Time   PT Rambo Low Complexity Minutes (75795) 20   Therapy Certification   Certification date from 04/25/23   Certification date to 07/18/23   Medical Diagnosis Lumbar radiculopathy (M54.16)     Thank you,    Ivette Grimes, PT, DPT

## 2023-04-28 ENCOUNTER — HOSPITAL ENCOUNTER (OUTPATIENT)
Dept: PHYSICAL THERAPY | Facility: CLINIC | Age: 73
Setting detail: THERAPIES SERIES
Discharge: HOME | End: 2023-04-28
Attending: FAMILY MEDICINE
Payer: MEDICARE

## 2023-04-28 PROCEDURE — 97140 MANUAL THERAPY 1/> REGIONS: CPT | Mod: GP | Performed by: PHYSICAL THERAPIST

## 2023-04-28 PROCEDURE — 97110 THERAPEUTIC EXERCISES: CPT | Mod: GP | Performed by: PHYSICAL THERAPIST

## 2023-05-02 ENCOUNTER — HOSPITAL ENCOUNTER (OUTPATIENT)
Dept: PHYSICAL THERAPY | Facility: CLINIC | Age: 73
Setting detail: THERAPIES SERIES
Discharge: HOME | End: 2023-05-02
Attending: FAMILY MEDICINE
Payer: MEDICARE

## 2023-05-02 PROCEDURE — 97110 THERAPEUTIC EXERCISES: CPT | Mod: GP | Performed by: PHYSICAL THERAPIST

## 2023-05-02 PROCEDURE — 97140 MANUAL THERAPY 1/> REGIONS: CPT | Mod: GP | Performed by: PHYSICAL THERAPIST

## 2023-05-04 ENCOUNTER — HOSPITAL ENCOUNTER (OUTPATIENT)
Dept: PHYSICAL THERAPY | Facility: CLINIC | Age: 73
Setting detail: THERAPIES SERIES
Discharge: HOME | End: 2023-05-04
Attending: FAMILY MEDICINE
Payer: MEDICARE

## 2023-05-04 PROCEDURE — 97140 MANUAL THERAPY 1/> REGIONS: CPT | Mod: GP | Performed by: PHYSICAL THERAPIST

## 2023-05-04 PROCEDURE — 97110 THERAPEUTIC EXERCISES: CPT | Mod: GP | Performed by: PHYSICAL THERAPIST

## 2023-05-09 ENCOUNTER — HOSPITAL ENCOUNTER (OUTPATIENT)
Dept: PHYSICAL THERAPY | Facility: CLINIC | Age: 73
Setting detail: THERAPIES SERIES
Discharge: HOME | End: 2023-05-09
Attending: FAMILY MEDICINE
Payer: MEDICARE

## 2023-05-09 PROCEDURE — 97110 THERAPEUTIC EXERCISES: CPT | Mod: GP | Performed by: PHYSICAL THERAPIST

## 2023-05-09 PROCEDURE — 97140 MANUAL THERAPY 1/> REGIONS: CPT | Mod: GP | Performed by: PHYSICAL THERAPIST

## 2023-05-17 ENCOUNTER — THERAPY VISIT (OUTPATIENT)
Dept: PHYSICAL THERAPY | Facility: CLINIC | Age: 73
End: 2023-05-17
Attending: FAMILY MEDICINE
Payer: MEDICARE

## 2023-05-17 DIAGNOSIS — M54.16 LUMBAR RADICULOPATHY: ICD-10-CM

## 2023-05-17 PROCEDURE — 97140 MANUAL THERAPY 1/> REGIONS: CPT | Mod: GP | Performed by: PHYSICAL THERAPIST

## 2023-05-17 PROCEDURE — 97110 THERAPEUTIC EXERCISES: CPT | Mod: GP | Performed by: PHYSICAL THERAPIST

## 2023-05-24 ENCOUNTER — THERAPY VISIT (OUTPATIENT)
Dept: PHYSICAL THERAPY | Facility: CLINIC | Age: 73
End: 2023-05-24
Attending: FAMILY MEDICINE
Payer: MEDICARE

## 2023-05-24 DIAGNOSIS — M54.16 LUMBAR RADICULOPATHY: Primary | ICD-10-CM

## 2023-05-24 PROCEDURE — 97110 THERAPEUTIC EXERCISES: CPT | Mod: GP | Performed by: PHYSICAL THERAPIST

## 2023-06-01 ENCOUNTER — THERAPY VISIT (OUTPATIENT)
Dept: PHYSICAL THERAPY | Facility: CLINIC | Age: 73
End: 2023-06-01
Attending: FAMILY MEDICINE
Payer: MEDICARE

## 2023-06-01 DIAGNOSIS — M54.16 LUMBAR RADICULOPATHY: Primary | ICD-10-CM

## 2023-06-01 PROCEDURE — 97110 THERAPEUTIC EXERCISES: CPT | Mod: GP | Performed by: PHYSICAL THERAPIST

## 2023-06-01 PROCEDURE — 97140 MANUAL THERAPY 1/> REGIONS: CPT | Mod: GP | Performed by: PHYSICAL THERAPIST

## 2023-06-09 ENCOUNTER — THERAPY VISIT (OUTPATIENT)
Dept: PHYSICAL THERAPY | Facility: CLINIC | Age: 73
End: 2023-06-09
Attending: FAMILY MEDICINE
Payer: MEDICARE

## 2023-06-09 DIAGNOSIS — M54.16 LUMBAR RADICULOPATHY: Primary | ICD-10-CM

## 2023-06-09 PROCEDURE — 97110 THERAPEUTIC EXERCISES: CPT | Mod: GP | Performed by: PHYSICAL THERAPIST

## 2023-06-14 ENCOUNTER — TELEPHONE (OUTPATIENT)
Dept: FAMILY MEDICINE | Facility: CLINIC | Age: 73
End: 2023-06-14
Payer: MEDICARE

## 2023-06-14 NOTE — TELEPHONE ENCOUNTER
Patient Quality Outreach    Patient is due for the following:   Colonoscopy    Next Steps:   - Complete colonoscopy  - Schedule annual wellness visit with fasting labs  - Vaccine update      Type of outreach:    Sent [employer] message.      Questions for provider review:    None           Faith Ruiz, CMA

## 2023-06-23 ENCOUNTER — THERAPY VISIT (OUTPATIENT)
Dept: PHYSICAL THERAPY | Facility: CLINIC | Age: 73
End: 2023-06-23
Attending: FAMILY MEDICINE
Payer: MEDICARE

## 2023-06-23 DIAGNOSIS — M54.16 LUMBAR RADICULOPATHY: Primary | ICD-10-CM

## 2023-06-23 PROCEDURE — 97110 THERAPEUTIC EXERCISES: CPT | Mod: GP | Performed by: PHYSICAL THERAPIST

## 2023-07-12 ENCOUNTER — HOSPITAL ENCOUNTER (OUTPATIENT)
Dept: ULTRASOUND IMAGING | Facility: CLINIC | Age: 73
Discharge: HOME | End: 2023-07-12
Attending: FAMILY MEDICINE
Payer: MEDICARE

## 2023-07-12 ENCOUNTER — HOSPITAL ENCOUNTER (OUTPATIENT)
Dept: MAMMOGRAPHY | Facility: CLINIC | Age: 73
Discharge: HOME | End: 2023-07-12
Attending: FAMILY MEDICINE
Payer: MEDICARE

## 2023-07-12 DIAGNOSIS — R92.8 BI-RADS CATEGORY 3 MAMMOGRAM RESULT: ICD-10-CM

## 2023-07-12 DIAGNOSIS — Z12.31 VISIT FOR SCREENING MAMMOGRAM: ICD-10-CM

## 2023-07-12 PROCEDURE — 76642 ULTRASOUND BREAST LIMITED: CPT | Mod: LT

## 2023-07-12 PROCEDURE — 77067 SCR MAMMO BI INCL CAD: CPT

## 2023-07-21 ENCOUNTER — THERAPY VISIT (OUTPATIENT)
Dept: PHYSICAL THERAPY | Facility: CLINIC | Age: 73
End: 2023-07-21
Attending: FAMILY MEDICINE
Payer: MEDICARE

## 2023-07-21 DIAGNOSIS — M54.16 LUMBAR RADICULOPATHY: Primary | ICD-10-CM

## 2023-07-21 PROCEDURE — 97140 MANUAL THERAPY 1/> REGIONS: CPT | Mod: GP | Performed by: PHYSICAL THERAPIST

## 2023-07-21 PROCEDURE — 97110 THERAPEUTIC EXERCISES: CPT | Mod: GP | Performed by: PHYSICAL THERAPIST

## 2023-07-21 NOTE — PROGRESS NOTES
07/21/23 0500   Appointment Info   Signing clinician's name / credentials Ivette Grimes, PT, DPT   Visits Used 11   Medical Diagnosis Lumbar radiculopathy (M54.16)   PT Tx Diagnosis LBP   Quick Adds Certification   Progress Note/Certification   Start of Care Date 04/25/23   Onset of illness/injury or Date of Surgery 04/13/23   Therapy Frequency every other week   Predicted Duration 8 weeks   Certification date from 07/19/23   Certification date to 09/13/23   GOALS   PT Goals 2;3;4   PT Goal 1   Goal Identifier 1.   Goal Description Patient will note a 50% decrease in symptoms down her leg in order to participate in daily activities.   Goal Progress met   Target Date 05/30/23   Date Met 06/09/23   PT Goal 2   Goal Identifier 2.   Goal Description Patient will tolerate transitioning from sitting<>standing and completing ADLs with <2/10 pain.   Goal Progress met   Target Date 05/30/23   Date Met 06/23/23   PT Goal 3   Goal Identifier 3.   Goal Description Patient will tolerate bending and picking up object with pain <2/10.   Goal Progress met   Target Date 06/20/23   Date Met 06/23/23   PT Goal 4   Goal Identifier 4.   Goal Description Paitent will tolerate standing and walking for >45 minutes with LRAD.   Goal Progress some fatigue, back tightness   Target Date 07/18/23   Subjective Report   Subjective Report Patient states overall has been doing good. Having very specific R ischial tub pain mainly when she is sitting in her car or sitting trying to read.   Treatment Interventions (PT)   Interventions Therapeutic Procedure/Exercise;Manual Therapy   Therapeutic Procedure/Exercise   Therapeutic Procedures: strength, endurance, ROM, flexibillity minutes (06193) 22   Ther Proc 1 strength, positioning   Ther Proc 1 - Details nustep 4 minutes level 4; prone glute max press towards ceiling 3x10; chair glute max/oppsing hip flexor stretch 2x20 seconds, horizontal chops 2x10 wtih 7# and blue TB; discussed walking program,  discussed sitting position and and lumbar support when sitting   Skilled Intervention instructed patient in technique and dosage of exercise, modified positioning as indicated, educated patient on POC   Manual Therapy   Manual Therapy: Mobilization, MFR, MLD, friction massage minutes (92699) 8   Manual Therapy 1 STM   Manual Therapy 1 - Details STM R glute/piriformis, proximal and distal R hamstring   Plan   Homework [id number]   Updates to plan of care 1-2 more visits   Plan for next session discuss lumbar support, glute strength/core strength with movement   Total Session Time   Timed Code Treatment Minutes 30   Total Treatment Time (sum of timed and untimed services) 30   Medicare Claim Information   Medical Diagnosis Lumbar radiculopathy (M54.16)   PT Diagnosis s/s lumbar radiculopathy and R sciatic nerve dysfunction   Start of Care Date 04/25/23   Onset date of current episode/exacerbation 04/07/23   Certification date from 04/25/23         [facility]                                                                                   OUTPATIENT PHYSICAL THERAPY    PLAN OF TREATMENT FOR OUTPATIENT REHABILITATION   Patient's Last Name, First Name, M.MELE MendesKay  K YOB: 1950   Provider's Name   [facility]   Medical Record No.  [id number]     Onset Date: 04/13/23  Start of Care Date: 04/25/23     Medical Diagnosis:  Lumbar radiculopathy (M54.16)      PT Treatment Diagnosis:  LBP Plan of Treatment  Frequency/Duration: every other week/ 8 weeks    Certification date from 07/19/23 to 09/13/23         See note for plan of treatment details and functional goals     Ivette Grimes, PT                         I CERTIFY THE NEED FOR THESE SERVICES FURNISHED UNDER        THIS PLAN OF TREATMENT AND WHILE UNDER MY CARE     (Physician attestation of this document indicates review and certification of the therapy plan).                Referring  Provider:  Princess Tariq      Initial Assessment  See Epic Evaluation- Start of Care Date: 04/25/23

## 2023-08-17 ENCOUNTER — THERAPY VISIT (OUTPATIENT)
Dept: PHYSICAL THERAPY | Facility: CLINIC | Age: 73
End: 2023-08-17
Attending: FAMILY MEDICINE
Payer: MEDICARE

## 2023-08-17 DIAGNOSIS — M54.16 LUMBAR RADICULOPATHY: Primary | ICD-10-CM

## 2023-08-17 PROCEDURE — 97110 THERAPEUTIC EXERCISES: CPT | Mod: GP | Performed by: PHYSICAL THERAPIST

## 2023-12-28 PROBLEM — M54.16 LUMBAR RADICULOPATHY: Status: RESOLVED | Noted: 2023-05-17 | Resolved: 2023-12-28

## 2023-12-28 NOTE — PROGRESS NOTES
DISCHARGE  Reason for Discharge: Patient has met all goals.    Equipment Issued: HEP    Discharge Plan: Patient to continue home program.    Referring Provider:  Princess Tariq

## 2024-06-02 ENCOUNTER — HEALTH MAINTENANCE LETTER (OUTPATIENT)
Age: 74
End: 2024-06-02

## 2024-07-02 ENCOUNTER — MYC MEDICAL ADVICE (OUTPATIENT)
Dept: FAMILY MEDICINE | Facility: CLINIC | Age: 74
End: 2024-07-02
Payer: MEDICARE

## 2024-07-02 DIAGNOSIS — U07.1 INFECTION DUE TO 2019 NOVEL CORONAVIRUS: Primary | ICD-10-CM

## 2024-07-03 NOTE — TELEPHONE ENCOUNTER
Do I need an appointment to get Paxlovid?   Symptoms began last night and positive Covid test this afternoon.   Symptoms: sore throat/congestion/deep cough/ sinuses full/body aches.   Current temp: 98.4    RN COVID TREATMENT VISIT  07/03/24      The patient has been triaged and does not require a higher level of care.    Kay Mendes  73 year old  Current weight? 207 lbs    Has the patient been seen by a primary care provider at an [facility] or [facility] within the past two years? Yes.   Have you been in close proximity to/do you have a known exposure to a person with a confirmed case of influenza? No.     General treatment eligibility:  Date of positive COVID test (PCR or at home)?  07/02/24    Are you or have you been hospitalized for this COVID-19 infection? No.   Have you received monoclonal antibodies or antiviral treatment for COVID-19 since this positive test? No.   Do you have any of the following conditions that place you at risk of being very sick from COVID-19?   - Age 50 years or older  Yes, patient has at least one high risk condition as noted above.     Current COVID symptoms:   - cough  - muscle or body aches  - sore throat  - congestion or runny nose  Yes. Patient has at least one symptom as selected.     How many days since symptoms started? 5 days or less. Established patient, 12 years or older weighing at least 88.2 lbs, who has symptoms that started in the past 5 days, has not been hospitalized nor received treatment already, and is at risk for being very sick from COVID-19.     Treatment eligibility by RN:  Are you currently pregnant or nursing? No  Do you have a clinically significant hypersensitivity to nirmatrelvir or ritonavir, or toxic epidermal necrolysis (TEN) or Hanson-Joshua Syndrome? No  Do you have a history of hepatitis, any hepatic impairment on the Problem List (such as Child-Santa Class C, cirrhosis, fatty liver disease, alcoholic liver disease),  or was the last liver lab (hepatic panel, ALT, AST, ALK Phos, bilirubin) elevated in the past 6 months? No  Do you have any history of severe renal impairment (eGFR < 30mL/min)? No    Is patient eligible to continue? Yes, patient meets all eligibility requirements for the RN COVID treatment (as denoted by all no responses above).     Current Outpatient Medications   Medication Sig Dispense Refill    omeprazole 20 MG tablet Take 1 tablet (20 mg) by mouth daily Take 30-60 minutes before a meal. 90 tablet 3       Medications from List 1 of the standing order (on medications that exclude the use of Paxlovid) that patient is taking: NONE. Is patient taking Ioana's Wort? No  Is patient taking [place]'s Wort or any meds from List 1? No.   Medications from List 2 of the standing order (on meds that provider needs to adjust) that patient is taking: NONE. Is patient on any of the meds from List 2? No.   Medications from List 3 of standing order (on meds that a RN needs to adjust) that patient is taking: NONE. Is patient on any meds from List 3? No.     Paxlovid has an approximate 90% reduction in hospitalization. Paxlovid can possibly cause altered sense of taste, diarrhea (loose, watery stools), high blood pressure, muscle aches.     Would patient like a Paxlovid prescription?   Yes.   Lab Results   Component Value Date    GFRESTIMATED 62 06/28/2012       Was last eGFR reduced? No, eGFR 60 or greater/ No Result on record. Patient can receive the normal renal function dose. Paxlovid Rx sent to [facility]    Temporary change to home medications: None    All medication adjustments (holds, etc) were discussed with the patient and patient was asked to repeat back (teachback) their med adjustment.  Did patient understand med adjustment? No medication adjustments needed.         Reviewed the following instructions with the patient:    Paxlovid (nimatrelvir and ritonavir)    How it works  Two medicines  (nirmatrelvir and ritonavir) are taken together. They stop the virus from growing. Less amount of virus is easier for your body to fight.    How to take  Medicine comes in a daily container with both medicine tablets. Take by mouth twice daily (once in the morning, once at night) for 5 days.  The number of tablets to take varies by patient.  Don't chew or break capsules. Swallow whole.    When to take  Take as soon as possible after positive COVID-19 test result, and within 5 days of your first symptoms.    Possible side effects  Can cause altered sense of taste, diarrhea (loose, watery stools), high blood pressure, muscle aches.    Pat Collins, RN

## 2024-07-20 ENCOUNTER — VIRTUAL VISIT (OUTPATIENT)
Dept: URGENT CARE | Facility: CLINIC | Age: 74
End: 2024-07-20
Payer: MEDICARE

## 2024-07-20 DIAGNOSIS — R21 RASH: ICD-10-CM

## 2024-07-20 DIAGNOSIS — B86 SCABIES: Primary | ICD-10-CM

## 2024-07-20 PROCEDURE — 99441 PR PHYSICIAN TELEPHONE EVALUATION 5-10 MIN: CPT | Mod: 93

## 2024-07-20 RX ORDER — PERMETHRIN 50 MG/G
CREAM TOPICAL
Qty: 60 G | Refills: 1 | Status: SHIPPED | OUTPATIENT
Start: 2024-07-20

## 2024-07-20 NOTE — PROGRESS NOTES
Kay is a 73 year old female who presents for a billable telephone visit.   ASSESSMENT/PLAN:  Diagnoses and all orders for this visit:    Scabies  -     permethrin (ELIMITE) 5 % external cream; Apply cream from head to toe (except the face); leave on for 8-14 hours then wash off with water; reapply in 1 week if live mites appear.    Rash    To use the above therapy. Discussed concerning signs and symptoms. To wash all towels, sheets, clothings in house.     Follow up with primary care provider with any problems, questions or concerns or if symptoms worsen or fail to improve. Patient agreed to plan and verbalized understanding.     SUBJECTIVE:    Kay presents with reports of history of trip and got rash. She reports history of poison ivy and assumed that is what it was. She has tried Benadryl and Cortisone which offer minimal relief. She reports it has spread. They are itchy.     ROS: Pertinent ROS neg other than the symptoms noted above in the HPI.     OBJECTIVE:  Vitals not done due to this being a virtual visit  GEN: No distress  RESP: No cough, no audible wheezing, able to talk in full sentences  Remainder of exam unable to be completed due to telephone visits    Gee Mesa PA-C    Call length: 9 minutes  Provider location during call: Home  Patient location during call: Home

## 2024-07-25 ENCOUNTER — HOSPITAL ENCOUNTER (EMERGENCY)
Facility: CLINIC | Age: 74
Discharge: HOME | End: 2024-07-25
Attending: NURSE PRACTITIONER | Admitting: NURSE PRACTITIONER
Payer: MEDICARE

## 2024-07-25 ENCOUNTER — APPOINTMENT (OUTPATIENT)
Dept: GENERAL RADIOLOGY | Facility: CLINIC | Age: 74
End: 2024-07-25
Attending: NURSE PRACTITIONER
Payer: MEDICARE

## 2024-07-25 VITALS
RESPIRATION RATE: 20 BRPM | SYSTOLIC BLOOD PRESSURE: 104 MMHG | TEMPERATURE: 98.6 F | HEART RATE: 98 BPM | OXYGEN SATURATION: 97 % | DIASTOLIC BLOOD PRESSURE: 76 MMHG

## 2024-07-25 DIAGNOSIS — J20.9 ACUTE BRONCHITIS, UNSPECIFIED ORGANISM: ICD-10-CM

## 2024-07-25 PROCEDURE — G0463 HOSPITAL OUTPT CLINIC VISIT: HCPCS | Mod: 25 | Performed by: NURSE PRACTITIONER

## 2024-07-25 PROCEDURE — 99213 OFFICE O/P EST LOW 20 MIN: CPT | Performed by: NURSE PRACTITIONER

## 2024-07-25 PROCEDURE — 71046 X-RAY EXAM CHEST 2 VIEWS: CPT

## 2024-07-25 RX ORDER — AMOXICILLIN 500 MG/1
500 CAPSULE ORAL 3 TIMES DAILY
Qty: 21 CAPSULE | Refills: 0 | Status: SHIPPED | OUTPATIENT
Start: 2024-07-25 | End: 2024-08-01

## 2024-07-25 RX ORDER — FLUCONAZOLE 150 MG/1
150 TABLET ORAL ONCE
Qty: 1 TABLET | Refills: 0 | Status: SHIPPED | OUTPATIENT
Start: 2024-07-25 | End: 2024-07-25

## 2024-07-25 RX ORDER — AZITHROMYCIN 250 MG/1
TABLET, FILM COATED ORAL
Qty: 6 TABLET | Refills: 0 | Status: SHIPPED | OUTPATIENT
Start: 2024-07-25 | End: 2024-07-30

## 2024-07-25 ASSESSMENT — ACTIVITIES OF DAILY LIVING (ADL)
ADLS_ACUITY_SCORE: 35
ADLS_ACUITY_SCORE: 35

## 2024-07-25 ASSESSMENT — COLUMBIA-SUICIDE SEVERITY RATING SCALE - C-SSRS
2. HAVE YOU ACTUALLY HAD ANY THOUGHTS OF KILLING YOURSELF IN THE PAST MONTH?: NO
1. IN THE PAST MONTH, HAVE YOU WISHED YOU WERE DEAD OR WISHED YOU COULD GO TO SLEEP AND NOT WAKE UP?: NO
6. HAVE YOU EVER DONE ANYTHING, STARTED TO DO ANYTHING, OR PREPARED TO DO ANYTHING TO END YOUR LIFE?: NO

## 2024-07-25 NOTE — ED PROVIDER NOTES
ED Provider Note  [facility]      History     Chief Complaint   Patient presents with    Cough     Post covid      HPI  Kay Mendes is a 73 year old female who presents with productive cough, fevers for the last 5 days.  Reports that she was seen and treated for COVID in the beginning of July her symptoms improved but have began again 5 days ago.  Denies any headache, sinus pain or congestion no rhinitis or skin rashes reported.  Denies any nausea, vomiting, diarrhea.  Patient reports that she is concerned she may have developed pneumonia from having COVID.            Allergies:  No Known Allergies    Problem List:    Patient Active Problem List    Diagnosis Date Noted    Morbid obesity (H) 02/07/2022     Priority: Medium    Esophageal reflux 02/11/2015     Priority: Medium    Diverticulosis of sigmoid colon 11/30/2012     Priority: Medium     See colonoscopy 11/30/12      CARDIOVASCULAR SCREENING; LDL GOAL LESS THAN 160 06/28/2012     Priority: Medium    External hemorrhoids 08/05/2009     Priority: Medium        Past Medical History:    Past Medical History:   Diagnosis Date    Allergic rhinitis, cause unspecified     Need for prophylactic hormone replacement therapy (postmenopausal)     Other and unspecified hyperlipidemia     Unspecified sinusitis (chronic)        Past Surgical History:    Past Surgical History:   Procedure Laterality Date    BIOPSY BREAST      COLONOSCOPY  11/30/2012    Procedure: COLONOSCOPY;  Colonoscopy  ;  Surgeon: Fidel Maradiaga MD;  Location: [facility]    HC REMOVAL OF TONSILS,<13 Y/O  01/01/1956    Tonsils <14y.o.    SURGICAL HISTORY OF -   01/01/1980    wisdom teeth    SURGICAL HISTORY OF -   01/01/1993    needle bx lt breast benign       Family History:    Family History   Problem Relation Age of Onset    Cancer Father         lung    Cancer Mother         breast, 72 years old    Neurologic Disorder Sister         ataxia-- genetic link 3 siblings( 1  brother, 2 sis) - affects speech and balance     Neurologic Disorder Brother     Neurologic Disorder Sister        Social History:  Marital Status:   [5]  Social History     Tobacco Use    Smoking status: Never    Smokeless tobacco: Never   Vaping Use    Vaping status: Never Used   Substance Use Topics    Alcohol use: Yes     Comment: rare    Drug use: No        Medications:    omeprazole 20 MG tablet  permethrin (ELIMITE) 5 % external cream          Review of Systems  A medically appropriate review of systems was performed with pertinent positives and negatives noted in the HPI, and all other systems negative.    Physical Exam   Patient Vitals for the past 24 hrs:   BP Temp Temp src Pulse Resp SpO2   07/25/24 1357 104/76 98.6  F (37  C) Tympanic 98 20 97 %          Physical Exam  General: alert and in no acute distress on arrival  Ears/Nose/Throat: ENT: Left Ear: TM intact, middle ear is not erythremic, no purulence, canal patent. Right Ear: TM intact, middle ear is not erythremic, no purulence, canal patent. Nose: No erythema or edema patent nostrils bilateral. Throat: midline uvula, non-erythremic, non-enlarged tonsils, without exudate.  No cervical adenopathy.   Head: atraumatic, normocephalic  Lungs:  nonlabored, scattered rhonchi in upper lobes decreased left lower and middle base, slightly diminished right base.  CV: Regular rate and rhythm, extremities warm and perfused, no new edema in lower extremities.  Abd: nondistended, nontender  Skin: no rashes, no diaphoresis and skin color normal  Neuro: Patient awake, alert, speech is fluent, no focal deficits  Psychiatric: affect/mood normal, appropriate historian.      ED Course                 Procedures                    Results for orders placed or performed during the hospital encounter of 07/25/24 (from the past 24 hour(s))   Chest XR,  PA & LAT    Narrative    CHEST TWO VIEWS 7/25/2024 2:51 PM     HISTORY: fever chills and productive  cough    COMPARISON: None.       Impression    IMPRESSION: No focal consolidation, pleural effusion or pneumothorax.  Cardiomediastinal silhouette is unremarkable.    ROBERT YANG MD         SYSTEM ID:  [id number]       MEDICATIONS GIVEN IN THE EMERGENCY DEPARTMENT:  Medications - No data to display             Assessments & Plan (with Medical Decision Making)  73 year old female who presents to the Urgent Care for evaluation of cough for 5 days with report of having tested positive and treatment for COVID in the beginning of July.  X-ray results are negative for him filtrate or pneumonia.  Diagnosis acute bronchitis, unspecified organism.  Due to concern for worsening bronchitis azithromycin and amoxicillin ordered, a fluconazole tablet was ordered to be taken mid antibiotic courses to prevent yeast infections.  Recommend follow-up in primary office next week, return here if symptoms worsen despite recommended treatment plan.       I have reviewed the nursing notes.    I have reviewed the findings, diagnosis, plan and need for follow up with the patient.        NEW PRESCRIPTIONS STARTED AT TODAY'S ER VISIT  Discharge Medication List as of 7/25/2024  3:27 PM        START taking these medications    Details   amoxicillin (AMOXIL) 500 MG capsule Take 1 capsule (500 mg) by mouth 3 times daily for 7 days, Disp-21 capsule, R-0, E-Prescribe      azithromycin (ZITHROMAX) 250 MG tablet Take 2 tablets (500 mg) by mouth daily for 1 day, THEN 1 tablet (250 mg) daily for 4 days., Disp-6 tablet, R-0, E-Prescribe      fluconazole (DIFLUCAN) 150 MG tablet Take 1 tablet (150 mg) by mouth once for 1 dose, Disp-1 tablet, R-0, E-Prescribe             Final diagnoses:   Acute bronchitis, unspecified organism       7/25/2024   [facility] EMERGENCY DEPT       Lee Ann Dwyer, APRN CNP  07/26/24 4507

## 2024-07-25 NOTE — DISCHARGE INSTRUCTIONS
Chest x-ray was negative for pneumonia today.  Will treat for bronchitis however have ordered azithromycin and amoxicillin to prevent bronchitis from turning into pneumonia.  If your fevers persist despite antibiotics recommend follow-up evaluation in the next 3 to 4 days.  Increase oral fluid intake manage pain or fever with Tylenol or ibuprofen.  Fluconazole tablet as ordered once to be taken on day 4 of antibiotic treatment to prevent yeast infection.  If your symptoms are worsening despite recommended treatment plan please return for further evaluation.

## 2024-11-07 ENCOUNTER — TELEPHONE (OUTPATIENT)
Dept: FAMILY MEDICINE | Facility: CLINIC | Age: 74
End: 2024-11-07
Payer: MEDICARE

## 2024-11-07 NOTE — TELEPHONE ENCOUNTER
Patient Quality Outreach    Patient is due for the following:   Colonoscopy    Next Steps:   - Schedule wellness visit with fasting labs  - vaccine update  - Complete colon screen    Type of outreach:    Sent [employer] message.      Questions for provider review:    None           Faith Ruiz, CMA

## 2025-06-05 ENCOUNTER — OFFICE VISIT (OUTPATIENT)
Dept: FAMILY MEDICINE | Facility: CLINIC | Age: 75
End: 2025-06-05
Payer: MEDICARE

## 2025-06-05 VITALS
SYSTOLIC BLOOD PRESSURE: 136 MMHG | RESPIRATION RATE: 18 BRPM | HEART RATE: 89 BPM | BODY MASS INDEX: 34.83 KG/M2 | HEIGHT: 64 IN | WEIGHT: 204 LBS | DIASTOLIC BLOOD PRESSURE: 82 MMHG | TEMPERATURE: 98 F | OXYGEN SATURATION: 97 %

## 2025-06-05 DIAGNOSIS — N63.11 MASS OF UPPER OUTER QUADRANT OF RIGHT BREAST: Primary | ICD-10-CM

## 2025-06-05 ASSESSMENT — PAIN SCALES - GENERAL: PAINLEVEL_OUTOF10: NO PAIN (0)

## 2025-06-05 NOTE — PROGRESS NOTES
"  Assessment & Plan     Mass of upper outer quadrant of right breast  Imaging ordered today for further evaluate breast tissue change that is present. AVS printed. She is scheduled for her AWV.   - US Breast Right Limited 1-3 Quadrants; Future  - MA Diagnostic Bilateral w/ Arnulfo; Future      BMI  Estimated body mass index is 35.02 kg/m  as calculated from the following:    Height as of this encounter: 1.626 m (5' 4\").    Weight as of this encounter: 92.5 kg (204 lb).       Waldemar Villanueva is a 74 year old, presenting for the following health issues:  Breast Pain        6/5/2025     9:15 AM   Additional Questions   Roomed by Jeana FUNEZ MA     History of Present Illness       Reason for visit:  Pain and lump in breast  Symptom onset:  1-3 days ago  Symptoms include:  Aching type pain in breast. Felt lump on Monday.  Symptom intensity:  Moderate  Symptom progression:  Staying the same  Had these symptoms before:  No   She is taking medications regularly.      *Scheduled for annual wellness with PCP    Breast Concern  Onset/Duration: Pain in breast started on Monday, also noticed a lump in upper quadrant. Does have some swelling she's noticed near her armpit  Description:   Location: upper outer quadrant  Pain or tenderness: YES  Redness:  no   Intensity: moderate  Progression of Symptoms: same and intermittent-notices pain more often when laying down  Accompanying Signs & Symptoms:  Any lumps in axillary region:  no , just tenderness   Movable: Unsure  Nipple discharge: None  Changes in the skin or nipple: None  On Hormone therapy:  no   Does it change with menstrual cycle:  no   Previous history of similar problem: None  First degree relative with breast cancer: a positive family history for breast cancer in her mother.  Precipitating factors:           Worsened by: Laying down  Alleviating factors:            Improved by: None  Therapies tried and outcome: None    Had something similar in the past but this seems more " "and different.  She also mentions there was a lesion in her left breast that was monitored over a couple interval mammograms this was benign.     No LMP recorded. Patient is postmenopausal.      Review of Systems  Constitutional, HEENT, cardiovascular, pulmonary, gi and gu systems are negative, except as otherwise noted.      Objective    /82   Pulse 89   Temp 98  F (36.7  C) (Tympanic)   Resp 18   Ht 1.626 m (5' 4\")   Wt 92.5 kg (204 lb)   SpO2 97%   BMI 35.02 kg/m    Body mass index is 35.02 kg/m .  Physical Exam   GENERAL: alert and no distress  BREAST: normal without masses, tenderness or nipple discharge, axillary findings: increased swelling / fluid sensation bottom of right axilla on the outer breast tissue, and mass right breast 12 oclock position approximately dime sized, firm. Slightly mobile.           Signed Electronically by: DANIEL Joseph CNP    "

## 2025-06-12 ENCOUNTER — ANCILLARY PROCEDURE (OUTPATIENT)
Dept: MAMMOGRAPHY | Facility: CLINIC | Age: 75
End: 2025-06-12
Attending: NURSE PRACTITIONER
Payer: MEDICARE

## 2025-06-12 ENCOUNTER — RESULTS FOLLOW-UP (OUTPATIENT)
Dept: FAMILY MEDICINE | Facility: CLINIC | Age: 75
End: 2025-06-12
Payer: MEDICARE

## 2025-06-12 DIAGNOSIS — N63.11 MASS OF UPPER OUTER QUADRANT OF RIGHT BREAST: ICD-10-CM

## 2025-06-12 PROCEDURE — 76642 ULTRASOUND BREAST LIMITED: CPT | Mod: RT

## 2025-06-12 PROCEDURE — 77062 BREAST TOMOSYNTHESIS BI: CPT

## 2025-06-13 SDOH — HEALTH STABILITY: PHYSICAL HEALTH: ON AVERAGE, HOW MANY MINUTES DO YOU ENGAGE IN EXERCISE AT THIS LEVEL?: 30 MIN

## 2025-06-13 SDOH — HEALTH STABILITY: PHYSICAL HEALTH: ON AVERAGE, HOW MANY DAYS PER WEEK DO YOU ENGAGE IN MODERATE TO STRENUOUS EXERCISE (LIKE A BRISK WALK)?: 1 DAY

## 2025-06-13 ASSESSMENT — SOCIAL DETERMINANTS OF HEALTH (SDOH): HOW OFTEN DO YOU GET TOGETHER WITH FRIENDS OR RELATIVES?: THREE TIMES A WEEK

## 2025-06-18 ENCOUNTER — RESULTS FOLLOW-UP (OUTPATIENT)
Dept: FAMILY MEDICINE | Facility: CLINIC | Age: 75
End: 2025-06-18

## 2025-06-18 ENCOUNTER — OFFICE VISIT (OUTPATIENT)
Dept: FAMILY MEDICINE | Facility: CLINIC | Age: 75
End: 2025-06-18
Payer: MEDICARE

## 2025-06-18 ENCOUNTER — MYC MEDICAL ADVICE (OUTPATIENT)
Dept: FAMILY MEDICINE | Facility: CLINIC | Age: 75
End: 2025-06-18

## 2025-06-18 VITALS
OXYGEN SATURATION: 96 % | TEMPERATURE: 98.4 F | BODY MASS INDEX: 34.29 KG/M2 | RESPIRATION RATE: 18 BRPM | DIASTOLIC BLOOD PRESSURE: 80 MMHG | SYSTOLIC BLOOD PRESSURE: 150 MMHG | HEIGHT: 65 IN | WEIGHT: 205.8 LBS | HEART RATE: 82 BPM

## 2025-06-18 DIAGNOSIS — Z78.0 ASYMPTOMATIC POSTMENOPAUSAL STATUS: ICD-10-CM

## 2025-06-18 DIAGNOSIS — R03.0 ELEVATED BP WITHOUT DIAGNOSIS OF HYPERTENSION: ICD-10-CM

## 2025-06-18 DIAGNOSIS — Z00.00 ENCOUNTER FOR MEDICARE ANNUAL WELLNESS EXAM: Primary | ICD-10-CM

## 2025-06-18 DIAGNOSIS — E66.01 MORBID OBESITY (H): ICD-10-CM

## 2025-06-18 DIAGNOSIS — Z12.11 SCREEN FOR COLON CANCER: ICD-10-CM

## 2025-06-18 DIAGNOSIS — Z13.1 SCREENING FOR DIABETES MELLITUS: ICD-10-CM

## 2025-06-18 DIAGNOSIS — Z13.6 SCREENING FOR CARDIOVASCULAR CONDITION: ICD-10-CM

## 2025-06-18 LAB
ALBUMIN SERPL BCG-MCNC: 4.2 G/DL (ref 3.5–5.2)
ALP SERPL-CCNC: 95 U/L (ref 40–150)
ALT SERPL W P-5'-P-CCNC: 9 U/L (ref 0–50)
ANION GAP SERPL CALCULATED.3IONS-SCNC: 5 MMOL/L (ref 7–15)
AST SERPL W P-5'-P-CCNC: 19 U/L (ref 0–45)
BILIRUB SERPL-MCNC: 0.5 MG/DL
BUN SERPL-MCNC: 14 MG/DL (ref 8–23)
CALCIUM SERPL-MCNC: 9.7 MG/DL (ref 8.8–10.4)
CHLORIDE SERPL-SCNC: 103 MMOL/L (ref 98–107)
CHOLEST SERPL-MCNC: 210 MG/DL
CREAT SERPL-MCNC: 0.97 MG/DL (ref 0.51–0.95)
EGFRCR SERPLBLD CKD-EPI 2021: 62 ML/MIN/1.73M2
FASTING STATUS PATIENT QL REPORTED: YES
FASTING STATUS PATIENT QL REPORTED: YES
GLUCOSE SERPL-MCNC: 100 MG/DL (ref 70–99)
HCO3 SERPL-SCNC: 31 MMOL/L (ref 22–29)
HDLC SERPL-MCNC: 59 MG/DL
LDLC SERPL CALC-MCNC: 134 MG/DL
NONHDLC SERPL-MCNC: 151 MG/DL
POTASSIUM SERPL-SCNC: 4.4 MMOL/L (ref 3.4–5.3)
PROT SERPL-MCNC: 7.6 G/DL (ref 6.4–8.3)
SODIUM SERPL-SCNC: 139 MMOL/L (ref 135–145)
TRIGL SERPL-MCNC: 85 MG/DL

## 2025-06-18 PROCEDURE — 3079F DIAST BP 80-89 MM HG: CPT | Performed by: FAMILY MEDICINE

## 2025-06-18 PROCEDURE — 3077F SYST BP >= 140 MM HG: CPT | Performed by: FAMILY MEDICINE

## 2025-06-18 PROCEDURE — G0438 PPPS, INITIAL VISIT: HCPCS | Performed by: FAMILY MEDICINE

## 2025-06-18 PROCEDURE — 1125F AMNT PAIN NOTED PAIN PRSNT: CPT | Performed by: FAMILY MEDICINE

## 2025-06-18 PROCEDURE — 36415 COLL VENOUS BLD VENIPUNCTURE: CPT | Performed by: FAMILY MEDICINE

## 2025-06-18 PROCEDURE — 80053 COMPREHEN METABOLIC PANEL: CPT | Performed by: FAMILY MEDICINE

## 2025-06-18 PROCEDURE — 80061 LIPID PANEL: CPT | Performed by: FAMILY MEDICINE

## 2025-06-18 ASSESSMENT — PAIN SCALES - GENERAL: PAINLEVEL_OUTOF10: MILD PAIN (1)

## 2025-06-18 NOTE — PATIENT INSTRUCTIONS
"    When you are out of refills or the refills say \"zero\", it is time to schedule your next appointment in clinic!    Labs are released to you almost immediately and sometimes before I have had a chance to review them.  I review labs regularly and once they are all in, you will either be sent a letter with your results or if you are signed up for on-line services, you will be notified that results are available to you on [employer]. If there are serious findings, you typically will be called.    If you have any questions about your visit, your symptoms, your medication, your test results or it is not clear what your diagnosis or treatment plan is please contact me (via [employer]) or call the care team at [phone] and say \"Care Team\"          "

## 2025-06-18 NOTE — PROGRESS NOTES
"Preventive Care Visit  [facility]  Princess Tariq MD, Family Medicine  Jun 18, 2025      Assessment & Plan     Encounter for Medicare annual wellness exam     - Comprehensive metabolic panel (BMP + Alb, Alk Phos, ALT, AST, Total. Bili, TP); Future  - Comprehensive metabolic panel (BMP + Alb, Alk Phos, ALT, AST, Total. Bili, TP)    Screening for diabetes mellitus       Screening for cardiovascular condition     - Lipid panel reflex to direct LDL Fasting; Future  - Comprehensive metabolic panel (BMP + Alb, Alk Phos, ALT, AST, Total. Bili, TP); Future  - Lipid panel reflex to direct LDL Fasting  - Comprehensive metabolic panel (BMP + Alb, Alk Phos, ALT, AST, Total. Bili, TP)    Screen for colon cancer  Overdue for colonoscopy  - Colonoscopy Screening  Referral; Future    Asymptomatic postmenopausal status   Needs DEXA  - DEXA HIP/PELVIS/SPINE - Future; Future    Morbid obesity (H)  Contributes to risk with hypertension    Elevated blood pressure without diagnosis of hypertension  Patient will keep an eye on blood pressure at home and send me some values in a few weeks.   If we need to start something, hydrochlorothiazide may be a good choice given mild ankle edema    Patient has been advised of split billing requirements and indicates understanding: Yes        BMI  Estimated body mass index is 34.71 kg/m  as calculated from the following:    Height as of this encounter: 1.64 m (5' 4.57\").    Weight as of this encounter: 93.4 kg (205 lb 12.8 oz).     Reviewed preventive health counseling, as reflected in patient instructions       Regular exercise       Healthy diet/nutrition  Counseling  Appropriate preventive services were addressed with this patient via screening, questionnaire, or discussion as appropriate for fall prevention, nutrition, physical activity, Tobacco-use cessation, social engagement, weight loss and cognition.  Checklist reviewing preventive services available has " been given to the patient.  Reviewed patient's diet, addressing concerns and/or questions.   She is at risk for lack of exercise and has been provided with information to increase physical activity for the benefit of her well-being.   Patient reported safety concerns were addressed today.          Waldemar Villanueva is a 74 year old, presenting for the following:  Physical and Health Maintenance        6/18/2025    11:11 AM   Additional Questions   Roomed by Bernadette Garcia CMA   Accompanied by self          HPI           Advance Care Planning    Discussed advance care planning with patient; informed AVS has link to [employer].        6/13/2025   General Health   How would you rate your overall physical health? Good   Feel stress (tense, anxious, or unable to sleep) Only a little   (!) STRESS CONCERN      6/13/2025   Nutrition   Diet: Regular (no restrictions)         6/13/2025   Exercise   Days per week of moderate/strenous exercise 1 day   Average minutes spent exercising at this level 30 min   (!) EXERCISE CONCERN      6/13/2025   Social Factors   Frequency of gathering with friends or relatives Three times a week   Worry food won't last until get money to buy more No   Food not last or not have enough money for food? No   Do you have housing? (Housing is defined as stable permanent housing and does not include staying outside in a car, in a tent, in an abandoned building, in an overnight shelter, or couch-surfing.) Yes   Are you worried about losing your housing? No   Lack of transportation? No   Unable to get utilities (heat,electricity)? No         6/18/2025   Fall Risk   Gait Speed Test (Document in seconds) 3.8    4.8   Gait Speed Test Interpretation Less than or equal to 5.00 seconds - PASS    Less than or equal to 5.00 seconds - PASS       Multiple values from one day are sorted in reverse-chronological order          6/13/2025   Activities of Daily Living- Home Safety   Needs help with the following  daily activites None of the above   Safety concerns in the home No grab bars in the bathroom    No handrails on the stairs       Multiple values from one day are sorted in reverse-chronological order         6/13/2025   Dental   Dentist two times every year? Yes         6/13/2025   Hearing Screening   Hearing concerns? None of the above         6/13/2025   Driving Risk Screening   Patient/family members have concerns about driving No         6/13/2025   General Alertness/Fatigue Screening   Have you been more tired than usual lately? No         6/13/2025   Urinary Incontinence Screening   Bothered by leaking urine in past 6 months No         Today's PHQ-2 Score:       6/17/2025     1:53 PM   PHQ-2 ( 1999 Pfizer)   Q1: Little interest or pleasure in doing things 0   Q2: Feeling down, depressed or hopeless 0   PHQ-2 Score 0    Q1: Little interest or pleasure in doing things Not at all   Q2: Feeling down, depressed or hopeless Not at all   PHQ-2 Score 0       Patient-reported           6/13/2025   Substance Use   Alcohol more than 3/day or more than 7/wk No   Do you have a current opioid prescription? No   How severe/bad is pain from 1 to 10? 0/10 (No Pain)   Do you use any other substances recreationally? No     Social History     Tobacco Use    Smoking status: Never    Smokeless tobacco: Never   Vaping Use    Vaping status: Never Used   Substance Use Topics    Alcohol use: Yes     Comment: rare    Drug use: No           6/12/2025   LAST FHS-7 RESULTS   1st degree relative breast or ovarian cancer Yes        Mammogram Screening - Mammogram every 1-2 years updated in Health Maintenance based on mutual decision making    ASCVD Risk   The ASCVD Risk score (Barbara RANDOLPH, et al., 2019) failed to calculate for the following reasons:    Cannot find a previous HDL lab    Cannot find a previous total cholesterol lab    Fracture Risk Assessment Tool  Link to Frax Calculator  Use the information below to complete the Frax  "calculator  : 1950  Sex: female  Weight (kg): 93.4 kg (actual weight)  Height (cm): 164 cm  Previous Fragility Fracture:  No  History of parent with fractured hip:  No  Current Smoking:  No  Patient has been on glucocorticoids for more than 3 months (5mg/day or more): No  Rheumatoid Arthritis on Problem List:  No  Secondary Osteoporosis on Problem List:  No  Consumes 3 or more units of alcohol per day: No  Femoral Neck BMD (g/cm2)            Reviewed and updated as needed this visit by Provider                      Current providers sharing in care for this patient include:  Patient Care Team:  Princess Tariq MD as PCP - General (Family Practice)  Princess Tariq MD as Assigned PCP    The following health maintenance items are reviewed in Epic and correct as of today:  Health Maintenance   Topic Date Due    DEXA  Never done    ADVANCE CARE PLANNING  2017    MEDICARE ANNUAL WELLNESS VISIT  10/15/2017    DIABETES SCREENING  2018    LIPID  2020    COLORECTAL CANCER SCREENING  2022    COVID-19 VACCINE ( season) 2025    ANNUAL REVIEW OF HM ORDERS  2026    FALL RISK ASSESSMENT  2026    MAMMO SCREENING  2027    DTAP/TDAP/TD VACCINE (3 - Td or Tdap) 2033    PHQ-2 (once per calendar year)  Completed    INFLUENZA VACCINE  Completed    PNEUMOCOCCAL VACCINE 50+ YEARS  Completed    ZOSTER VACCINE  Completed    RSV VACCINE  Completed    HPV VACCINE  Aged Out    MENINGITIS VACCINE  Aged Out    HEPATITIS C SCREENING  Discontinued         Review of Systems  Constitutional, HEENT, cardiovascular, pulmonary, gi and gu systems are negative, except as otherwise noted.     Objective    Exam  BP (!) 150/91 (BP Location: Right arm, Patient Position: Sitting, Cuff Size: Adult Regular)   Pulse 82   Temp 98.4  F (36.9  C) (Tympanic)   Resp 18   Ht 1.64 m (5' 4.57\")   Wt 93.4 kg (205 lb 12.8 oz)   SpO2 96%   Breastfeeding No   BMI 34.71 kg/m     Estimated body " "mass index is 34.71 kg/m  as calculated from the following:    Height as of this encounter: 1.64 m (5' 4.57\").    Weight as of this encounter: 93.4 kg (205 lb 12.8 oz).    Physical Exam  GENERAL: alert and no distress  NECK: no adenopathy, no asymmetry, masses, or scars  RESP: lungs clear to auscultation - no rales, rhonchi or wheezes  CV: regular rate and rhythm, normal S1 S2, no S3 or S4, no murmur, click or rub, no peripheral edema  ABDOMEN: soft, nontender, no hepatosplenomegaly, no masses and bowel sounds normal  MS: 1+ edema bilaterally  SKIN: no suspicious lesions or rashes  NEURO: Normal strength and tone, mentation intact and speech normal  PSYCH: mentation appears normal, affect normal/bright         6/18/2025   Mini Cog   Clock Draw Score 2 Normal   3 Item Recall 3 objects recalled   Mini Cog Total Score 5             6/18/2025   Vision Screen   Patient wears corrective lenses (select all that apply) Worn during vision screen   Vision Screen Results Pass       Signed Electronically by: Princess Tariq MD    "